# Patient Record
Sex: FEMALE | Race: WHITE | NOT HISPANIC OR LATINO | Employment: OTHER | URBAN - METROPOLITAN AREA
[De-identification: names, ages, dates, MRNs, and addresses within clinical notes are randomized per-mention and may not be internally consistent; named-entity substitution may affect disease eponyms.]

---

## 2017-07-31 ENCOUNTER — HISTORICAL (OUTPATIENT)
Dept: RADIOLOGY | Facility: HOSPITAL | Age: 72
End: 2017-07-31

## 2017-10-02 ENCOUNTER — HISTORICAL (OUTPATIENT)
Dept: SURGERY | Facility: HOSPITAL | Age: 72
End: 2017-10-02

## 2022-04-07 ENCOUNTER — HISTORICAL (OUTPATIENT)
Dept: ADMINISTRATIVE | Facility: HOSPITAL | Age: 77
End: 2022-04-07

## 2022-04-24 VITALS
HEIGHT: 63 IN | BODY MASS INDEX: 34.55 KG/M2 | DIASTOLIC BLOOD PRESSURE: 96 MMHG | WEIGHT: 195 LBS | SYSTOLIC BLOOD PRESSURE: 137 MMHG

## 2022-11-11 ENCOUNTER — HOSPITAL ENCOUNTER (INPATIENT)
Facility: HOSPITAL | Age: 77
LOS: 2 days | Discharge: HOME | DRG: 101 | End: 2022-11-13
Attending: STUDENT IN AN ORGANIZED HEALTH CARE EDUCATION/TRAINING PROGRAM | Admitting: INTERNAL MEDICINE
Payer: COMMERCIAL

## 2022-11-11 DIAGNOSIS — E86.0 DEHYDRATION: ICD-10-CM

## 2022-11-11 DIAGNOSIS — R55 CONVULSIVE SYNCOPE: ICD-10-CM

## 2022-11-11 DIAGNOSIS — R40.20 LOSS OF CONSCIOUSNESS: ICD-10-CM

## 2022-11-11 DIAGNOSIS — I48.0 PAROXYSMAL ATRIAL FIBRILLATION: ICD-10-CM

## 2022-11-11 DIAGNOSIS — J18.9 PNEUMONIA OF LEFT LOWER LOBE DUE TO INFECTIOUS ORGANISM: ICD-10-CM

## 2022-11-11 DIAGNOSIS — R55 SYNCOPE: ICD-10-CM

## 2022-11-11 DIAGNOSIS — I48.91 A-FIB: ICD-10-CM

## 2022-11-11 DIAGNOSIS — R55 SYNCOPE, UNSPECIFIED SYNCOPE TYPE: Primary | ICD-10-CM

## 2022-11-11 LAB
ALBUMIN SERPL-MCNC: 4.3 GM/DL (ref 3.4–4.8)
ALBUMIN/GLOB SERPL: 1.3 RATIO (ref 1.1–2)
ALP SERPL-CCNC: 69 UNIT/L (ref 40–150)
ALT SERPL-CCNC: 25 UNIT/L (ref 0–55)
APTT PPP: 26.1 SECONDS (ref 23.2–33.7)
AST SERPL-CCNC: 22 UNIT/L (ref 5–34)
BASOPHILS # BLD AUTO: 0.07 X10(3)/MCL (ref 0–0.2)
BASOPHILS NFR BLD AUTO: 0.5 %
BILIRUBIN DIRECT+TOT PNL SERPL-MCNC: 0.8 MG/DL
BUN SERPL-MCNC: 28.2 MG/DL (ref 9.8–20.1)
CALCIUM SERPL-MCNC: 9.6 MG/DL (ref 8.4–10.2)
CHLORIDE SERPL-SCNC: 103 MMOL/L (ref 98–107)
CO2 SERPL-SCNC: 26 MMOL/L (ref 23–31)
CREAT SERPL-MCNC: 1.11 MG/DL (ref 0.55–1.02)
EOSINOPHIL # BLD AUTO: 0.04 X10(3)/MCL (ref 0–0.9)
EOSINOPHIL NFR BLD AUTO: 0.3 %
ERYTHROCYTE [DISTWIDTH] IN BLOOD BY AUTOMATED COUNT: 12.2 % (ref 11.5–17)
GFR SERPLBLD CREATININE-BSD FMLA CKD-EPI: 51 MLS/MIN/1.73/M2
GLOBULIN SER-MCNC: 3.4 GM/DL (ref 2.4–3.5)
GLUCOSE SERPL-MCNC: 131 MG/DL (ref 82–115)
HCT VFR BLD AUTO: 48.9 % (ref 37–47)
HGB BLD-MCNC: 17 GM/DL (ref 12–16)
IMM GRANULOCYTES # BLD AUTO: 0.04 X10(3)/MCL (ref 0–0.04)
IMM GRANULOCYTES NFR BLD AUTO: 0.3 %
INR BLD: 0.99 (ref 0–1.3)
LYMPHOCYTES # BLD AUTO: 2.34 X10(3)/MCL (ref 0.6–4.6)
LYMPHOCYTES NFR BLD AUTO: 18.3 %
MAGNESIUM SERPL-MCNC: 1.6 MG/DL (ref 1.6–2.6)
MCH RBC QN AUTO: 30.8 PG (ref 27–31)
MCHC RBC AUTO-ENTMCNC: 34.8 MG/DL (ref 33–36)
MCV RBC AUTO: 88.6 FL (ref 80–94)
MONOCYTES # BLD AUTO: 0.86 X10(3)/MCL (ref 0.1–1.3)
MONOCYTES NFR BLD AUTO: 6.7 %
NEUTROPHILS # BLD AUTO: 9.4 X10(3)/MCL (ref 2.1–9.2)
NEUTROPHILS NFR BLD AUTO: 73.9 %
NRBC BLD AUTO-RTO: 0 %
PLATELET # BLD AUTO: 235 X10(3)/MCL (ref 130–400)
PMV BLD AUTO: 12 FL (ref 7.4–10.4)
POTASSIUM SERPL-SCNC: 4.1 MMOL/L (ref 3.5–5.1)
PROT SERPL-MCNC: 7.7 GM/DL (ref 5.8–7.6)
PROTHROMBIN TIME: 13 SECONDS (ref 12.5–14.5)
RBC # BLD AUTO: 5.52 X10(6)/MCL (ref 4.2–5.4)
SODIUM SERPL-SCNC: 141 MMOL/L (ref 136–145)
T4 FREE SERPL-MCNC: 1.08 NG/DL (ref 0.7–1.48)
TROPONIN I SERPL-MCNC: <0.01 NG/ML (ref 0–0.04)
TSH SERPL-ACNC: 0.6 UIU/ML (ref 0.35–4.94)
WBC # SPEC AUTO: 12.8 X10(3)/MCL (ref 4.5–11.5)

## 2022-11-11 PROCEDURE — 11000001 HC ACUTE MED/SURG PRIVATE ROOM

## 2022-11-11 PROCEDURE — 93010 ELECTROCARDIOGRAM REPORT: CPT | Mod: 77,,, | Performed by: INTERNAL MEDICINE

## 2022-11-11 PROCEDURE — 96365 THER/PROPH/DIAG IV INF INIT: CPT

## 2022-11-11 PROCEDURE — 96361 HYDRATE IV INFUSION ADD-ON: CPT

## 2022-11-11 PROCEDURE — 84443 ASSAY THYROID STIM HORMONE: CPT | Performed by: NURSE PRACTITIONER

## 2022-11-11 PROCEDURE — 85025 COMPLETE CBC W/AUTO DIFF WBC: CPT | Performed by: NURSE PRACTITIONER

## 2022-11-11 PROCEDURE — 85730 THROMBOPLASTIN TIME PARTIAL: CPT | Performed by: NURSE PRACTITIONER

## 2022-11-11 PROCEDURE — 93010 ELECTROCARDIOGRAM REPORT: CPT | Mod: ,,, | Performed by: INTERNAL MEDICINE

## 2022-11-11 PROCEDURE — 99285 EMERGENCY DEPT VISIT HI MDM: CPT | Mod: 25

## 2022-11-11 PROCEDURE — 83735 ASSAY OF MAGNESIUM: CPT | Performed by: NURSE PRACTITIONER

## 2022-11-11 PROCEDURE — 84484 ASSAY OF TROPONIN QUANT: CPT | Performed by: NURSE PRACTITIONER

## 2022-11-11 PROCEDURE — 93010 EKG 12-LEAD: ICD-10-PCS | Mod: ,,, | Performed by: INTERNAL MEDICINE

## 2022-11-11 PROCEDURE — 25000003 PHARM REV CODE 250: Performed by: STUDENT IN AN ORGANIZED HEALTH CARE EDUCATION/TRAINING PROGRAM

## 2022-11-11 PROCEDURE — 93005 ELECTROCARDIOGRAM TRACING: CPT

## 2022-11-11 PROCEDURE — 84439 ASSAY OF FREE THYROXINE: CPT | Performed by: NURSE PRACTITIONER

## 2022-11-11 PROCEDURE — 80053 COMPREHEN METABOLIC PANEL: CPT | Performed by: NURSE PRACTITIONER

## 2022-11-11 PROCEDURE — 93010 EKG 12-LEAD: ICD-10-PCS | Mod: 77,,, | Performed by: INTERNAL MEDICINE

## 2022-11-11 PROCEDURE — 63600175 PHARM REV CODE 636 W HCPCS: Performed by: STUDENT IN AN ORGANIZED HEALTH CARE EDUCATION/TRAINING PROGRAM

## 2022-11-11 PROCEDURE — 85610 PROTHROMBIN TIME: CPT | Performed by: NURSE PRACTITIONER

## 2022-11-11 RX ADMIN — SODIUM CHLORIDE, POTASSIUM CHLORIDE, SODIUM LACTATE AND CALCIUM CHLORIDE 1000 ML: 600; 310; 30; 20 INJECTION, SOLUTION INTRAVENOUS at 08:11

## 2022-11-11 RX ADMIN — CEFTRIAXONE SODIUM 1 G: 1 INJECTION, POWDER, FOR SOLUTION INTRAMUSCULAR; INTRAVENOUS at 10:11

## 2022-11-11 NOTE — FIRST PROVIDER EVALUATION
"Medical screening examination initiated.  I have conducted a focused provider triage encounter, findings are as follows:    Brief history of present illness:  78 y/o female presents with having syncopal episode at home and being pale and diaphoretic. Vomit x1. EMS notes ekg shows afib, no history    Vitals:    11/11/22 1729   BP: 112/63   Pulse: 92   Resp: 18   Temp: 98.2 °F (36.8 °C)   TempSrc: Tympanic   SpO2: 95%   Weight: 90.7 kg (200 lb)   Height: 5' 4" (1.626 m)       Pertinent physical exam:  alert, nonlabored, on EMS stretcher, answers questions    Brief workup plan:  ekg, labs, urine, imaging    Preliminary workup initiated; this workup will be continued and followed by the physician or advanced practice provider that is assigned to the patient when roomed.  "

## 2022-11-12 PROBLEM — R55 CONVULSIVE SYNCOPE: Status: ACTIVE | Noted: 2022-11-12

## 2022-11-12 LAB
ALBUMIN SERPL-MCNC: 3.7 GM/DL (ref 3.4–4.8)
ALBUMIN/GLOB SERPL: 1.5 RATIO (ref 1.1–2)
ALP SERPL-CCNC: 56 UNIT/L (ref 40–150)
ALT SERPL-CCNC: 18 UNIT/L (ref 0–55)
AMPHET UR QL SCN: NEGATIVE
APPEARANCE UR: CLEAR
AST SERPL-CCNC: 14 UNIT/L (ref 5–34)
BACTERIA #/AREA URNS AUTO: ABNORMAL /HPF
BARBITURATE SCN PRESENT UR: NEGATIVE
BASOPHILS # BLD AUTO: 0.05 X10(3)/MCL (ref 0–0.2)
BASOPHILS NFR BLD AUTO: 0.6 %
BENZODIAZ UR QL SCN: NEGATIVE
BILIRUB UR QL STRIP.AUTO: NEGATIVE MG/DL
BILIRUBIN DIRECT+TOT PNL SERPL-MCNC: 0.8 MG/DL
BUN SERPL-MCNC: 26.4 MG/DL (ref 9.8–20.1)
CALCIUM SERPL-MCNC: 8.9 MG/DL (ref 8.4–10.2)
CANNABINOIDS UR QL SCN: POSITIVE
CHLORIDE SERPL-SCNC: 105 MMOL/L (ref 98–107)
CO2 SERPL-SCNC: 28 MMOL/L (ref 23–31)
COCAINE UR QL SCN: NEGATIVE
COLOR UR AUTO: YELLOW
CREAT SERPL-MCNC: 0.81 MG/DL (ref 0.55–1.02)
EOSINOPHIL # BLD AUTO: 0.1 X10(3)/MCL (ref 0–0.9)
EOSINOPHIL NFR BLD AUTO: 1.1 %
ERYTHROCYTE [DISTWIDTH] IN BLOOD BY AUTOMATED COUNT: 12.3 % (ref 11.5–17)
EST. AVERAGE GLUCOSE BLD GHB EST-MCNC: 131.2 MG/DL
FENTANYL UR QL SCN: NEGATIVE
GFR SERPLBLD CREATININE-BSD FMLA CKD-EPI: >60 MLS/MIN/1.73/M2
GLOBULIN SER-MCNC: 2.4 GM/DL (ref 2.4–3.5)
GLUCOSE SERPL-MCNC: 112 MG/DL (ref 82–115)
GLUCOSE UR QL STRIP.AUTO: NEGATIVE MG/DL
HBA1C MFR BLD: 6.2 %
HCT VFR BLD AUTO: 39.3 % (ref 37–47)
HGB BLD-MCNC: 13.5 GM/DL (ref 12–16)
IMM GRANULOCYTES # BLD AUTO: 0.03 X10(3)/MCL (ref 0–0.04)
IMM GRANULOCYTES NFR BLD AUTO: 0.3 %
KETONES UR QL STRIP.AUTO: NEGATIVE MG/DL
LEUKOCYTE ESTERASE UR QL STRIP.AUTO: ABNORMAL UNIT/L
LYMPHOCYTES # BLD AUTO: 3 X10(3)/MCL (ref 0.6–4.6)
LYMPHOCYTES NFR BLD AUTO: 34.5 %
MCH RBC QN AUTO: 30.6 PG (ref 27–31)
MCHC RBC AUTO-ENTMCNC: 34.4 MG/DL (ref 33–36)
MCV RBC AUTO: 89.1 FL (ref 80–94)
MDMA UR QL SCN: NEGATIVE
MONOCYTES # BLD AUTO: 0.66 X10(3)/MCL (ref 0.1–1.3)
MONOCYTES NFR BLD AUTO: 7.6 %
NEUTROPHILS # BLD AUTO: 4.9 X10(3)/MCL (ref 2.1–9.2)
NEUTROPHILS NFR BLD AUTO: 55.9 %
NITRITE UR QL STRIP.AUTO: POSITIVE
NRBC BLD AUTO-RTO: 0 %
OPIATES UR QL SCN: NEGATIVE
PCP UR QL: NEGATIVE
PH UR STRIP.AUTO: 5.5 [PH]
PH UR: 5.5 [PH] (ref 3–11)
PLATELET # BLD AUTO: 189 X10(3)/MCL (ref 130–400)
PMV BLD AUTO: 12.8 FL (ref 7.4–10.4)
POTASSIUM SERPL-SCNC: 3.2 MMOL/L (ref 3.5–5.1)
PROT SERPL-MCNC: 6.1 GM/DL (ref 5.8–7.6)
PROT UR QL STRIP.AUTO: NEGATIVE MG/DL
RBC # BLD AUTO: 4.41 X10(6)/MCL (ref 4.2–5.4)
RBC #/AREA URNS AUTO: <5 /HPF
RBC UR QL AUTO: NEGATIVE UNIT/L
SODIUM SERPL-SCNC: 143 MMOL/L (ref 136–145)
SP GR UR STRIP.AUTO: 1.02 (ref 1–1.03)
SPECIFIC GRAVITY, URINE AUTO (.000) (OHS): 1.02 (ref 1–1.03)
SQUAMOUS #/AREA URNS AUTO: <5 /HPF
TROPONIN I SERPL-MCNC: 0.01 NG/ML (ref 0–0.04)
TROPONIN I SERPL-MCNC: <0.01 NG/ML (ref 0–0.04)
UROBILINOGEN UR STRIP-ACNC: 0.2 MG/DL
WBC # SPEC AUTO: 8.7 X10(3)/MCL (ref 4.5–11.5)
WBC #/AREA URNS AUTO: 10 /HPF

## 2022-11-12 PROCEDURE — 99222 1ST HOSP IP/OBS MODERATE 55: CPT | Mod: ,,, | Performed by: SPECIALIST

## 2022-11-12 PROCEDURE — 36415 COLL VENOUS BLD VENIPUNCTURE: CPT | Performed by: INTERNAL MEDICINE

## 2022-11-12 PROCEDURE — 99222 PR INITIAL HOSPITAL CARE,LEVL II: ICD-10-PCS | Mod: ,,, | Performed by: SPECIALIST

## 2022-11-12 PROCEDURE — 25000003 PHARM REV CODE 250: Performed by: INTERNAL MEDICINE

## 2022-11-12 PROCEDURE — 80053 COMPREHEN METABOLIC PANEL: CPT | Performed by: INTERNAL MEDICINE

## 2022-11-12 PROCEDURE — 83036 HEMOGLOBIN GLYCOSYLATED A1C: CPT | Performed by: INTERNAL MEDICINE

## 2022-11-12 PROCEDURE — 84484 ASSAY OF TROPONIN QUANT: CPT | Performed by: INTERNAL MEDICINE

## 2022-11-12 PROCEDURE — 63600175 PHARM REV CODE 636 W HCPCS: Performed by: INTERNAL MEDICINE

## 2022-11-12 PROCEDURE — 85025 COMPLETE CBC W/AUTO DIFF WBC: CPT | Performed by: INTERNAL MEDICINE

## 2022-11-12 PROCEDURE — 21400001 HC TELEMETRY ROOM

## 2022-11-12 PROCEDURE — 80307 DRUG TEST PRSMV CHEM ANLYZR: CPT | Performed by: INTERNAL MEDICINE

## 2022-11-12 PROCEDURE — 95816 EEG AWAKE AND DROWSY: CPT | Mod: 26,,, | Performed by: SPECIALIST

## 2022-11-12 PROCEDURE — 81001 URINALYSIS AUTO W/SCOPE: CPT | Performed by: INTERNAL MEDICINE

## 2022-11-12 PROCEDURE — 95816 PR EEG,W/AWAKE & DROWSY RECORD: ICD-10-PCS | Mod: 26,,, | Performed by: SPECIALIST

## 2022-11-12 PROCEDURE — 95819 EEG AWAKE AND ASLEEP: CPT

## 2022-11-12 RX ORDER — SODIUM CHLORIDE 0.9 % (FLUSH) 0.9 %
10 SYRINGE (ML) INJECTION
Status: DISCONTINUED | OUTPATIENT
Start: 2022-11-12 | End: 2022-11-13 | Stop reason: HOSPADM

## 2022-11-12 RX ORDER — OXYBUTYNIN CHLORIDE 5 MG/1
5 TABLET ORAL DAILY
Status: DISCONTINUED | OUTPATIENT
Start: 2022-11-12 | End: 2022-11-13 | Stop reason: HOSPADM

## 2022-11-12 RX ORDER — ENOXAPARIN SODIUM 100 MG/ML
1 INJECTION SUBCUTANEOUS
Status: DISCONTINUED | OUTPATIENT
Start: 2022-11-12 | End: 2022-11-12

## 2022-11-12 RX ORDER — OXYBUTYNIN CHLORIDE 5 MG/1
5 TABLET, EXTENDED RELEASE ORAL DAILY
COMMUNITY
Start: 2022-09-20

## 2022-11-12 RX ORDER — AMLODIPINE BESYLATE 5 MG/1
10 TABLET ORAL DAILY
Status: DISCONTINUED | OUTPATIENT
Start: 2022-11-12 | End: 2022-11-13 | Stop reason: HOSPADM

## 2022-11-12 RX ORDER — LISINOPRIL 10 MG/1
10 TABLET ORAL DAILY
Status: DISCONTINUED | OUTPATIENT
Start: 2022-11-12 | End: 2022-11-13 | Stop reason: HOSPADM

## 2022-11-12 RX ORDER — KETOROLAC TROMETHAMINE 5 MG/ML
1 SOLUTION OPHTHALMIC 4 TIMES DAILY
COMMUNITY

## 2022-11-12 RX ORDER — AMLODIPINE BESYLATE 10 MG/1
10 TABLET ORAL DAILY
COMMUNITY
Start: 2022-09-20

## 2022-11-12 RX ORDER — PANTOPRAZOLE SODIUM 40 MG/1
40 TABLET, DELAYED RELEASE ORAL DAILY
Status: DISCONTINUED | OUTPATIENT
Start: 2022-11-12 | End: 2022-11-13 | Stop reason: HOSPADM

## 2022-11-12 RX ORDER — PREDNISOLONE ACETATE 10 MG/ML
1 SUSPENSION/ DROPS OPHTHALMIC 4 TIMES DAILY
COMMUNITY

## 2022-11-12 RX ORDER — IBUPROFEN 800 MG/1
800 TABLET ORAL 3 TIMES DAILY
COMMUNITY
Start: 2022-11-01

## 2022-11-12 RX ORDER — EZETIMIBE 10 MG/1
10 TABLET ORAL DAILY
Status: DISCONTINUED | OUTPATIENT
Start: 2022-11-12 | End: 2022-11-13 | Stop reason: HOSPADM

## 2022-11-12 RX ORDER — LISINOPRIL AND HYDROCHLOROTHIAZIDE 10; 12.5 MG/1; MG/1
1 TABLET ORAL DAILY
COMMUNITY
Start: 2022-10-30

## 2022-11-12 RX ORDER — PANTOPRAZOLE SODIUM 40 MG/1
40 TABLET, DELAYED RELEASE ORAL 2 TIMES DAILY PRN
Status: DISCONTINUED | OUTPATIENT
Start: 2022-11-12 | End: 2022-11-13 | Stop reason: HOSPADM

## 2022-11-12 RX ORDER — IBUPROFEN 800 MG/1
800 TABLET ORAL 3 TIMES DAILY
Status: DISCONTINUED | OUTPATIENT
Start: 2022-11-12 | End: 2022-11-13 | Stop reason: HOSPADM

## 2022-11-12 RX ORDER — EZETIMIBE 10 MG/1
10 TABLET ORAL DAILY
COMMUNITY
Start: 2022-09-20

## 2022-11-12 RX ORDER — SODIUM CHLORIDE, SODIUM LACTATE, POTASSIUM CHLORIDE, CALCIUM CHLORIDE 600; 310; 30; 20 MG/100ML; MG/100ML; MG/100ML; MG/100ML
INJECTION, SOLUTION INTRAVENOUS CONTINUOUS
Status: DISCONTINUED | OUTPATIENT
Start: 2022-11-12 | End: 2022-11-13 | Stop reason: HOSPADM

## 2022-11-12 RX ORDER — OFLOXACIN 3 MG/ML
1 SOLUTION/ DROPS OPHTHALMIC 4 TIMES DAILY
COMMUNITY

## 2022-11-12 RX ORDER — HYDROCHLOROTHIAZIDE 12.5 MG/1
12.5 TABLET ORAL DAILY
Status: DISCONTINUED | OUTPATIENT
Start: 2022-11-12 | End: 2022-11-13 | Stop reason: HOSPADM

## 2022-11-12 RX ORDER — ESOMEPRAZOLE MAGNESIUM 40 MG/1
40 CAPSULE, DELAYED RELEASE ORAL DAILY
COMMUNITY

## 2022-11-12 RX ORDER — MAG HYDROX/ALUMINUM HYD/SIMETH 200-200-20
30 SUSPENSION, ORAL (FINAL DOSE FORM) ORAL EVERY 6 HOURS PRN
Status: DISCONTINUED | OUTPATIENT
Start: 2022-11-12 | End: 2022-11-13 | Stop reason: HOSPADM

## 2022-11-12 RX ORDER — TALC
6 POWDER (GRAM) TOPICAL NIGHTLY PRN
Status: DISCONTINUED | OUTPATIENT
Start: 2022-11-12 | End: 2022-11-13 | Stop reason: HOSPADM

## 2022-11-12 RX ADMIN — Medication 6 MG: at 02:11

## 2022-11-12 RX ADMIN — OXYBUTYNIN CHLORIDE 5 MG: 5 TABLET ORAL at 10:11

## 2022-11-12 RX ADMIN — AMLODIPINE BESYLATE 10 MG: 5 TABLET ORAL at 10:11

## 2022-11-12 RX ADMIN — HYDROCHLOROTHIAZIDE 12.5 MG: 12.5 TABLET ORAL at 10:11

## 2022-11-12 RX ADMIN — PANTOPRAZOLE SODIUM 40 MG: 40 TABLET, DELAYED RELEASE ORAL at 03:11

## 2022-11-12 RX ADMIN — PANTOPRAZOLE SODIUM 40 MG: 40 TABLET, DELAYED RELEASE ORAL at 10:11

## 2022-11-12 RX ADMIN — EZETIMIBE 10 MG: 10 TABLET ORAL at 10:11

## 2022-11-12 RX ADMIN — IBUPROFEN 800 MG: 800 TABLET, FILM COATED ORAL at 08:11

## 2022-11-12 RX ADMIN — ENOXAPARIN SODIUM 90 MG: 100 INJECTION SUBCUTANEOUS at 01:11

## 2022-11-12 RX ADMIN — SODIUM CHLORIDE, POTASSIUM CHLORIDE, SODIUM LACTATE AND CALCIUM CHLORIDE: 600; 310; 30; 20 INJECTION, SOLUTION INTRAVENOUS at 06:11

## 2022-11-12 RX ADMIN — LISINOPRIL 10 MG: 10 TABLET ORAL at 10:11

## 2022-11-12 RX ADMIN — ALUMINUM HYDROXIDE, MAGNESIUM HYDROXIDE, AND DIMETHICONE 30 ML: 200; 20; 200 SUSPENSION ORAL at 04:11

## 2022-11-12 RX ADMIN — IBUPROFEN 800 MG: 800 TABLET, FILM COATED ORAL at 10:11

## 2022-11-12 RX ADMIN — Medication 6 MG: at 08:11

## 2022-11-12 RX ADMIN — APIXABAN 5 MG: 5 TABLET, FILM COATED ORAL at 08:11

## 2022-11-12 NOTE — H&P
[facility] Hospital Medicine History & Physical Examination       Patient Name: Yolanda Price  MRN: [id number]  Patient Class: IP- Inpatient   Admission Date: 11/11/2022  6:19 PM  Length of Stay: 0  Admitting Service: Hospital Medicine   Attending Physician: Parth Montemayor MD   Primary Care Provider: No primary care provider on file.  History source: EMR, patient and/or patient's family    CHIEF COMPLAINT   Loss of Consciousness (Pt reports per aasi c/o syncopal episode while sitting in chair at home. Pt initially diaphoretic and pale, and reports vomiting x1 since this episode. GCS15)      HISTORY OF PRESENT ILLNESS:   Patient is a pleasant 77-year-old female with a history of hypertension who was brought to the ER after having a witnessed syncopal/seizure episode.  Daughter explains at bedside patient was sitting down in the chair and then all of a sudden started to make strange noises with her mouth, and began foaming at the mouth.  During this time daughter was trying to get her attention but was unable to.  She does report vocalizations during this time but no general tonic-clonic movements.  She states these lasted for about 5 minutes.  She explains her mother was diaphoretic during this time as well.  She explains her mother did not eat much throughout the day, and is smoking marijuana.  Denies a history of seizures or strokes.    On arrival to the ER patient was afebrile, hemodynamically stable.  Initial EKG showed atrial fibrillation and then repeat EKG showed sinus arrhythmia.  Laboratory work was significant for hemoconcentration and mild BARB.  CT head was unremarkable.  Chest x-ray showed a patchy left basilar opacity.    PAST MEDICAL HISTORY:   Hypertension    PAST SURGICAL HISTORY:   Cataract surgery    ALLERGIES:   Patient has no allergy information on record.    FAMILY HISTORY:   Reviewed and non-contributory     SOCIAL HISTORY:   Marijuana use   Denies  alcohol use      HOME MEDICATIONS:     Prior to Admission medications    Not on File       REVIEW OF SYSTEMS:   Except as documented, all other systems reviewed and negative     PHYSICAL EXAM:   T 98.2 °F (36.8 °C)   BP (!) 143/65   P 71   RR 16   O2 97 %  GENERAL: awake, alert, oriented and in no acute distress, non-toxic appearing   HEENT: normocephalic atraumatic   NECK: supple   LUNGS: Clear bilaterally, no wheezing or rales, no accessory muscle use   CVS: Regular rate and rhythm, normal peripheral perfusion  ABD: Soft, non-tender, non-distended, bowel sounds present  EXTREMITIES: no clubbing or cyanosis  SKIN: Warm, dry.   NEURO: alert and oriented, grossly without focal deficits   PSYCHIATRIC: Cooperative    LABS AND IMAGING:     Recent Labs     11/11/22 1836   WBC 12.8*   RBC 5.52*   HGB 17.0*   HCT 48.9*   MCV 88.6   MCH 30.8   MCHC 34.8   RDW 12.2        No results for input(s): LACTIC in the last 72 hours.  Recent Labs     11/11/22 1836   INR 0.99     No results for input(s): HGBA1C, CHOL, TRIG, LDL, VLDL, HDL in the last 72 hours.   Recent Labs     11/11/22 1836      K 4.1   CHLORIDE 103   CO2 26   BUN 28.2*   CREATININE 1.11*   GLUCOSE 131*   CALCIUM 9.6   MG 1.60   ALBUMIN 4.3   GLOBULIN 3.4   ALKPHOS 69   ALT 25   AST 22   BILITOT 0.8   TSH 0.6048     Recent Labs     11/11/22 1836   TROPONINI <0.010          X-Ray Chest 1 View  Impression: Patchy left basilar opacity may be related to atelectasis or pneumonia.  Recommend follow up PA and lateral radiographs in 4-6 weeks after completion of appropriate therapy to ensure resolution and exclude persistent opacity, nodule or mass.  Electronically signed by: Joselyn Skelton  Date:    11/11/2022  Time:    18:55    CT Head Without Contrast  Impression: No appreciable acute intracranial abnormality.  White matter changes and old lacunar infarct most suggestive of chronic small vessel ischemic disease.  Electronically signed by: Joselyn  Costa  Date:    11/11/2022  Time:    18:47      ASSESSMENT & PLAN:   Seizure versus syncope   Left lower lung nodular opacity   AFib, newly found  HX:  HTN, cataracts, marijuana use    -MRI brain, EEG, seizure precautions   -echo, carotid ultrasound, telemetry monitoring   -full-dose Lovenox; [id number] is 4, needs OAC at discharge  -CT thorax to better characterize left lower lung opacity    DVT prophylaxis: full dose lovenox  Code status: full     If patient was admitted under observational status it is with my approval/permission.     At least 55 min was spent on this history and physical.  Time seen: 11P<   Parth Montemayor MD

## 2022-11-12 NOTE — CONSULTS
Inpatient consult to Cardiology  Consult performed by: ALEXUS Raza  Consult ordered by: Ivan Pacheco MD  Reason for consult: PAF    [facility]  Cardiology  Consult Note    Patient Name: Yolanda Price  MRN: [id number]  Admission Date: 11/11/2022  Hospital Length of Stay: 1 days  Code Status: Full Code   Attending Provider: Parth Montemayor MD   Consulting Provider: ALEXUS Raza  Primary Care Physician: No primary care provider on file.  Principal Problem:Syncope    Patient information was obtained from patient, past medical records, and ER records.     Subjective:     Chief Complaint:  new onset afib     HPI:   Ms. Price is a 77 year old female who was previously known to [facility], Dr. Varma ([id number]). She presents to the ED after having a witnessed syncopal/seizure episode. Her daughter reports that the patient was sitting in the chair, then all of a sudden started foaming at the mouth & making strange noises for about 5 minutes. She reports that she was diaphoretic during this time. Upon arrival to the ED, an EKG that initially demonstrated Afib. A repeat EKG was obtained & demonstrated Sinus arrhythmia. Significant labwork includes WBC 12.8. Troponins were negative x 3. Her UDS was positive for cannabis. [facility] has been consulted for new afib.     PMH: HTN  PSH: cataract surgery   Family History:   Social History: Reports marijuana use. Denies alcohol or tobacco use.     Previous Cardiac Diagnostics:   BL Carotid US 11.12.22:  The right internal carotid artery demonstrated less than 50% stenosis.  The left internal carotid artery demonstrated no hemodynamically significant stenosis.   Bilateral vertebral arteries were patent with antegrade flow.    LHC 10.2.17:  Normal coronaries, false positive stress test, mild PAD.    TTE 9.14.17:  The study quality is good.   Global left ventricular systolic function is normal. The left ventricular ejection fraction  is 65%. Left ventricular diastolic function is normal.   Trace mitral and tricuspid regurgitation.   Mild calcification of the aortic valve is noted with adequate cuspal excursion.   Mild mitral annular calcification is noted.   The estimated pulmonary artery systolic pressure is 27 mmHg assuming a right atrial pressure of 5 mmHg.     Review of patient's allergies indicates:  Not on File    Review of Systems   Respiratory:  Negative for shortness of breath.    Cardiovascular:  Negative for chest pain and palpitations.     Objective:     Vital Signs (Most Recent):  Temp: 97.7 °F (36.5 °C) (11/12/22 1115)  Pulse: 66 (11/12/22 1115)  Resp: 20 (11/12/22 1115)  BP: 122/71 (11/12/22 1115)  SpO2: 95 % (11/12/22 1115) Vital Signs (24h Range):  Temp:  [97.7 °F (36.5 °C)-98.2 °F (36.8 °C)] 97.7 °F (36.5 °C)  Pulse:  [62-92] 66  Resp:  [16-20] 20  SpO2:  [91 %-100 %] 95 %  BP: (106-143)/(63-78) 122/71     Weight: 90.7 kg (200 lb)  Body mass index is 34.33 kg/m².    SpO2: 95 %  O2 Device (Oxygen Therapy): room air    No intake or output data in the 24 hours ending 11/12/22 1222    Lines/Drains/Airways       Peripheral Intravenous Line  Duration                  Peripheral IV - Single Lumen 11/11/22 1831 20 G Left;Posterior Hand <1 day                    Significant Labs:  Recent Results (from the past 72 hour(s))   CBC with Differential    Collection Time: 11/11/22  6:36 PM   Result Value Ref Range    WBC 12.8 (H) 4.5 - 11.5 x10(3)/mcL    RBC 5.52 (H) 4.20 - 5.40 x10(6)/mcL    Hgb 17.0 (H) 12.0 - 16.0 gm/dL    Hct 48.9 (H) 37.0 - 47.0 %    MCV 88.6 80.0 - 94.0 fL    MCH 30.8 27.0 - 31.0 pg    MCHC 34.8 33.0 - 36.0 mg/dL    RDW 12.2 11.5 - 17.0 %    Platelet 235 130 - 400 x10(3)/mcL    MPV 12.0 (H) 7.4 - 10.4 fL    Neut % 73.9 %    Lymph % 18.3 %    Mono % 6.7 %    Eos % 0.3 %    Basophil % 0.5 %    Lymph # 2.34 0.6 - 4.6 x10(3)/mcL    Neut # 9.4 (H) 2.1 - 9.2 x10(3)/mcL    Mono # 0.86 0.1 - 1.3 x10(3)/mcL    Eos # 0.04 0 - 0.9  x10(3)/mcL    Baso # 0.07 0 - 0.2 x10(3)/mcL    IG# 0.04 0 - 0.04 x10(3)/mcL    IG% 0.3 %    NRBC% 0.0 %   Comprehensive Metabolic Panel    Collection Time: 11/11/22  6:36 PM   Result Value Ref Range    Sodium Level 141 136 - 145 mmol/L    Potassium Level 4.1 3.5 - 5.1 mmol/L    Chloride 103 98 - 107 mmol/L    Carbon Dioxide 26 23 - 31 mmol/L    Glucose Level 131 (H) 82 - 115 mg/dL    Blood Urea Nitrogen 28.2 (H) 9.8 - 20.1 mg/dL    Creatinine 1.11 (H) 0.55 - 1.02 mg/dL    Calcium Level Total 9.6 8.4 - 10.2 mg/dL    Protein Total 7.7 (H) 5.8 - 7.6 gm/dL    Albumin Level 4.3 3.4 - 4.8 gm/dL    Globulin 3.4 2.4 - 3.5 gm/dL    Albumin/Globulin Ratio 1.3 1.1 - 2.0 ratio    Bilirubin Total 0.8 <=1.5 mg/dL    Alkaline Phosphatase 69 40 - 150 unit/L    Alanine Aminotransferase 25 0 - 55 unit/L    Aspartate Aminotransferase 22 5 - 34 unit/L    eGFR 51 mls/min/1.73/m2   Magnesium    Collection Time: 11/11/22  6:36 PM   Result Value Ref Range    Magnesium Level 1.60 1.60 - 2.60 mg/dL   TSH    Collection Time: 11/11/22  6:36 PM   Result Value Ref Range    Thyroid Stimulating Hormone 0.6048 0.3500 - 4.9400 uIU/mL   Protime-INR    Collection Time: 11/11/22  6:36 PM   Result Value Ref Range    PT 13.0 12.5 - 14.5 seconds    INR 0.99 0.00 - 1.30   APTT    Collection Time: 11/11/22  6:36 PM   Result Value Ref Range    PTT 26.1 23.2 - 33.7 seconds   Troponin I    Collection Time: 11/11/22  6:36 PM   Result Value Ref Range    Troponin-I <0.010 0.000 - 0.045 ng/mL   T4, Free    Collection Time: 11/11/22  6:36 PM   Result Value Ref Range    Thyroxine Free 1.08 0.70 - 1.48 ng/dL   Hemoglobin A1C    Collection Time: 11/12/22 12:37 AM   Result Value Ref Range    Hemoglobin A1c 6.2 <=7.0 %    Estimated Average Glucose 131.2 mg/dL   Troponin I    Collection Time: 11/12/22 12:37 AM   Result Value Ref Range    Troponin-I 0.015 0.000 - 0.045 ng/mL   Comprehensive metabolic panel    Collection Time: 11/12/22  5:25 AM   Result Value Ref Range     Sodium Level 143 136 - 145 mmol/L    Potassium Level 3.2 (L) 3.5 - 5.1 mmol/L    Chloride 105 98 - 107 mmol/L    Carbon Dioxide 28 23 - 31 mmol/L    Glucose Level 112 82 - 115 mg/dL    Blood Urea Nitrogen 26.4 (H) 9.8 - 20.1 mg/dL    Creatinine 0.81 0.55 - 1.02 mg/dL    Calcium Level Total 8.9 8.4 - 10.2 mg/dL    Protein Total 6.1 5.8 - 7.6 gm/dL    Albumin Level 3.7 3.4 - 4.8 gm/dL    Globulin 2.4 2.4 - 3.5 gm/dL    Albumin/Globulin Ratio 1.5 1.1 - 2.0 ratio    Bilirubin Total 0.8 <=1.5 mg/dL    Alkaline Phosphatase 56 40 - 150 unit/L    Alanine Aminotransferase 18 0 - 55 unit/L    Aspartate Aminotransferase 14 5 - 34 unit/L    eGFR >60 mls/min/1.73/m2   Troponin I    Collection Time: 11/12/22  5:25 AM   Result Value Ref Range    Troponin-I <0.010 0.000 - 0.045 ng/mL   CBC with Differential    Collection Time: 11/12/22  5:25 AM   Result Value Ref Range    WBC 8.7 4.5 - 11.5 x10(3)/mcL    RBC 4.41 4.20 - 5.40 x10(6)/mcL    Hgb 13.5 12.0 - 16.0 gm/dL    Hct 39.3 37.0 - 47.0 %    MCV 89.1 80.0 - 94.0 fL    MCH 30.6 27.0 - 31.0 pg    MCHC 34.4 33.0 - 36.0 mg/dL    RDW 12.3 11.5 - 17.0 %    Platelet 189 130 - 400 x10(3)/mcL    MPV 12.8 (H) 7.4 - 10.4 fL    Neut % 55.9 %    Lymph % 34.5 %    Mono % 7.6 %    Eos % 1.1 %    Basophil % 0.6 %    Lymph # 3.00 0.6 - 4.6 x10(3)/mcL    Neut # 4.9 2.1 - 9.2 x10(3)/mcL    Mono # 0.66 0.1 - 1.3 x10(3)/mcL    Eos # 0.10 0 - 0.9 x10(3)/mcL    Baso # 0.05 0 - 0.2 x10(3)/mcL    IG# 0.03 0 - 0.04 x10(3)/mcL    IG% 0.3 %    NRBC% 0.0 %   Drug Screen, Urine    Collection Time: 11/12/22  9:23 AM   Result Value Ref Range    Amphetamines, Urine Negative Negative    Barbituates, Urine Negative Negative    Benzodiazepine, Urine Negative Negative    Cannabinoids, Urine Positive (A) Negative    Cocaine, Urine Negative Negative    Fentanyl, Urine Negative Negative    MDMA, Urine Negative Negative    Opiates, Urine Negative Negative    Phencyclidine, Urine Negative Negative    pH, Urine 5.5 3.0 -  11.0    Specific Gravity, Urine Auto 1.022 1.001 - 1.035   Urinalysis, Reflex to Urine Culture Urine, Clean Catch    Collection Time: 11/12/22  9:24 AM    Specimen: Urine   Result Value Ref Range    Color, UA Yellow Yellow, Light-Yellow, Dark Yellow, [place], Straw    Appearance, UA Clear Clear    Specific Gravity, UA 1.022 1.001 - 1.030    pH, UA 5.5 5.0 - 8.5    Protein, UA Negative Negative mg/dL    Glucose, UA Negative Negative, Normal mg/dL    Ketones, UA Negative Negative mg/dL    Blood, UA Negative Negative unit/L    Bilirubin, UA Negative Negative mg/dL    Urobilinogen, UA 0.2 0.2, 1.0, Normal mg/dL    Nitrites, UA Positive (A) Negative    Leukocyte Esterase, UA Trace (A) Negative unit/L   Urinalysis, Microscopic    Collection Time: 11/12/22  9:24 AM   Result Value Ref Range    RBC, UA <5 <=5 /HPF    WBC, UA 10 (H) <=5 /HPF    Squamous Epithelial Cells, UA <5 <=5 /HPF    Bacteria, UA None Seen None Seen, Rare, Occasional /HPF   CV Ultrasound Bilateral Doppler Carotid    Collection Time: 11/12/22  9:56 AM   Result Value Ref Range    Left ICA/CCA ratio 0.89     Right ICA/CCA ratio 0.93     Left Highest ICA 67.00     Left Highest CCA 75     Right Highest ICA 63.00     Right Highest CCA 73     Right Highest EDV 15.00     LT Highest EDV 16.00     Right CCA prox sys 73 cm/s    Right CCA prox storey 12 cm/s    Right CCA dist sys 68 cm/s    Right CCA dist storey 12 cm/s    Right ICA prox sys 45 cm/s    Right ICA prox storey 8 cm/s    Right ICA mid sys 62 cm/s    Right ICA mid storey 15 cm/s    Right ICA dist sys 63 cm/s    Right ICA dist storey 14 cm/s    Right ECA sys 85 cm/s    Right vertebral sys 37 cm/s    Left CCA prox sys 66 cm/s    Left CCA prox storey 11 cm/s    Left CCA dist sys 75 cm/s    Left CCA dist storey 13 cm/s    Left ICA prox sys 46 cm/s    Left ICA prox storey 9 cm/s    Left ICA mid sys 60 cm/s    Left ICA mid storey 16 cm/s    Left ICA dist sys 67 cm/s    Left ICA dist storey 16 cm/s    Left ECA sys 115 cm/s    Left  vertebral sys 47 cm/s    Right ECA storey 6 cm/s    Left vertebral storey 5 cm/s    Left ECA storey 4 cm/s       Significant Imaging:  Imaging Results              MRI Brain Without Contrast (Final result)  Result time 11/12/22 09:27:16      Final result by Ibeth Paulino MD (11/12/22 09:27:16)                   Impression:      1. No acute intracranial abnormality.  2. Moderate chronic microvascular ischemic changes.  [facility].      Electronically signed by: Ibeth Paulino  Date:    11/12/2022  Time:    09:27               Narrative:    EXAMINATION:  MRI BRAIN WITHOUT CONTRAST    CLINICAL HISTORY:  Seizure, new-onset, no history of trauma;    TECHNIQUE:  Multiplanar, multisequence MR images of the brain were obtained without the administration of intravenous contrast.    COMPARISON:  CT head dated 11/11/2022    FINDINGS:  There is no restricted diffusion, acute hemorrhage or edema.  There are moderate patchy T2/FLAIR hyperintensities in the subcortical and periventricular white matter, likely representing chronic microvascular ischemic changes.  There have been prior lacunar infarcts in the right basal ganglia with hemosiderin staining.  The hippocampi are similar in size and signal.    There is no mass effect or midline shift.  The basal cisterns are patent.  There is moderate diffuse parenchymal volume loss.  There is no hydrocephalus or abnormal extra-axial fluid collection.  The major intracranial flow voids are patent.  The paranasal sinuses are clear.  There are bilateral mastoid effusions.                        Preliminary result by Ibeth Paulino MD (11/12/22 02:49:31)                   Narrative:    START OF REPORT:  Technique: Multiplanar, multisequence magnetic resonance imaging of the brain was performed without intravenous contrast.    Comparison: Correlation is with CT study dated2022-11-11 18:41:43.    Clinical history: LOC, possible  seizure.    Findings:  Hemorrhage: No acute intracranial hemorrhage is identified.  Stroke: No abnormal signal is identified on the diffusion images to suggest acute infarct.  Extra axial spaces: The ventricles and sulci and basal cisterns all appear moderately prominent consistent with global cerebral atrophy.  Cerebral, cerebellar, and brainstem parenchyma: Moderate periventricular and subcortical white matter signal abnormalities are seen. The main consideration is small vessel ischemic changes. Again noted is an old lacunar infarct in the right basal ganglia (series 7, image 12).  Cranial nerves: Normal.  Herniation: None.  Calvarium: Within normal limits.  Vascular system: Normal flow voids.  Visualized paranasal sinuses: Normal.  Visualized orbits: The visualized orbits appear unremarkable.  Sella and skull base: Normal.  Temporal bones and mastoids: Minimal fluid is also seen in the right mastoid air cells. These may be related to mastoid effusions.      Impression:  1. No acute intracranial process is identified. Details as above.                          Preliminary result by Alejandro Iglesias MD (11/12/22 02:49:31)                   Narrative:    START OF REPORT:  Technique: Multiplanar, multisequence magnetic resonance imaging of the brain was performed without intravenous contrast.    Comparison: Correlation is with CT study dated2022-11-11 18:41:43.    Clinical history: LOC, possible seizure.    Findings:  Hemorrhage: No acute intracranial hemorrhage is identified.  Stroke: No abnormal signal is identified on the diffusion images to suggest acute infarct.  Extra axial spaces: The ventricles and sulci and basal cisterns all appear moderately prominent consistent with global cerebral atrophy.  Cerebral, cerebellar, and brainstem parenchyma: Moderate periventricular and subcortical white matter signal abnormalities are seen. The main consideration is small vessel ischemic changes. Again noted is an old  lacunar infarct in the right basal ganglia (series 7, image 12).  Cranial nerves: Normal.  Herniation: None.  Calvarium: Within normal limits.  Vascular system: Normal flow voids.  Visualized paranasal sinuses: Normal.  Visualized orbits: The visualized orbits appear unremarkable.  Sella and skull base: Normal.  Temporal bones and mastoids: Minimal fluid is also seen in the right mastoid air cells. These may be related to mastoid effusions.      Impression:  1. No acute intracranial process is identified. Details as above.                                         CT Chest Without Contrast (Final result)  Result time 11/12/22 09:53:54      Final result by Joselyn Skelton MD (11/12/22 09:53:54)                   Impression:      No pulmonary nodule.      Electronically signed by: Joselyn Skelton  Date:    11/12/2022  Time:    09:53               Narrative:    EXAMINATION:  CT CHEST WITHOUT CONTRAST    CLINICAL HISTORY:  Abnormal xray - lung nodule, >= 1 cm;    TECHNIQUE:  Helically acquired axial images, sagittal and coronal reformations were obtained from the thoracic inlet to the lung bases without the IV administration of contrast.    Automated tube current modulation, weight-based exposure dosing, and/or iterative reconstruction technique utilized to reach lowest reasonably achievable exposure rate.    DLP: 711 mGy*cm    COMPARISON:  No relevant priors available for comparison at time of this dictation    FINDINGS:  BASE OF NECK: No significant abnormality.    AORTA: Aortic atherosclerosis.    HEART: Normal size. No effusion. There are coronary artery calcifications.    MORE/MEDIASTINUM: No enlarged lymph nodes by size criteria.  Evaluation of hilar lymphadenopathy is limited without intravenous contrast. Small sliding hiatal hernia.    AIRWAYS: Patent.    LUNGS: Mild dependent atelectasis.  No pulmonary nodule.    PLEURA: No pleural effusion or pneumothorax.    UPPER ABDOMEN: No abnormality of the partially  imaged upper abdomen.    THORACIC SOFT TISSUES: Probable sebaceous cyst at the upper back to the right of midline.    BONES: Thoracic spondylosis.                        Preliminary result by Joselyn Skelton MD (11/12/22 01:24:52)                   Narrative:    START OF REPORT:  Technique: CT Scan of the chest was performed without intravenous contrast with direct axial as well as sagittal and, coronal, reconstruction images.    Dosage Information: Automated Exposure Control was utilized.    Comparison: None.    Clinical History: Sob.    Findings:  Soft Tissues: Unremarkable.  Neck: The visualized soft tissues of the neck appear unremarkable.  Mediastinum: The mediastinal structures are within normal limits. There is a small hiatal hernia.  Heart: Coronary artery calcification is seen.  Aorta: Mild aortic calcification is seen in the thoracic aorta.  Pulmonary Arteries: Unremarkable.  Lungs: Subtle scattered streaky linear opacity is seen in the dependent portions at the lung bases consistent with scarring and subsegmental atelectasis. No acute focal infiltrate or consolidation is seen.  Pleura: No effusions or pneumothorax are identified.  Bony Structures:  Spine: Severe multilevel spondylolytic changes are seen in the thoracic spine.  Ribs: No rib fractures are identified.  Abdomen: The visualized upper abdominal organs appear unremarkable.      Impression:  1. No acute focal infiltrate or consolidation is seen.  2. No acute intrathoracic process identified. Details and other findings as discussed above.                          Preliminary result by Alejandro Iglesias MD (11/12/22 01:24:52)                   Narrative:    START OF REPORT:  Technique: CT Scan of the chest was performed without intravenous contrast with direct axial as well as sagittal and, coronal, reconstruction images.    Dosage Information: Automated Exposure Control was utilized.    Comparison: None.    Clinical History:  Sob.    Findings:  Soft Tissues: Unremarkable.  Neck: The visualized soft tissues of the neck appear unremarkable.  Mediastinum: The mediastinal structures are within normal limits. There is a small hiatal hernia.  Heart: Coronary artery calcification is seen.  Aorta: Mild aortic calcification is seen in the thoracic aorta.  Pulmonary Arteries: Unremarkable.  Lungs: Subtle scattered streaky linear opacity is seen in the dependent portions at the lung bases consistent with scarring and subsegmental atelectasis. No acute focal infiltrate or consolidation is seen.  Pleura: No effusions or pneumothorax are identified.  Bony Structures:  Spine: Severe multilevel spondylolytic changes are seen in the thoracic spine.  Ribs: No rib fractures are identified.  Abdomen: The visualized upper abdominal organs appear unremarkable.      Impression:  1. No acute focal infiltrate or consolidation is seen.  2. No acute intrathoracic process identified. Details and other findings as discussed above.                                         X-Ray Chest 1 View (Final result)  Result time 11/11/22 18:55:48      Final result by Joselyn Skelton MD (11/11/22 18:55:48)                   Impression:      Patchy left basilar opacity may be related to atelectasis or pneumonia.  Recommend follow up PA and lateral radiographs in 4-6 weeks after completion of appropriate therapy to ensure resolution and exclude persistent opacity, nodule or mass.      Electronically signed by: Joselyn Skelton  Date:    11/11/2022  Time:    18:55               Narrative:    EXAMINATION:  XR CHEST 1 VIEW    CLINICAL HISTORY:  afib;    TECHNIQUE:  Single frontal view of the chest was performed.    COMPARISON:  None    FINDINGS:  LINES AND TUBES: EKG/telemetry leads overlie the chest.    MEDIASTINUM AND MORE: The cardiac silhouette is normal. Aortic atherosclerosis.    LUNGS: Patchy left basilar opacity.    PLEURA:No pleural effusion. No pneumothorax.    OTHER:  No acute osseous abnormality.                                       CT Head Without Contrast (Final result)  Result time 11/11/22 18:47:00      Final result by Joselyn Skelton MD (11/11/22 18:47:00)                   Impression:      No appreciable acute intracranial abnormality.    White matter changes and old lacunar infarct most suggestive of chronic small vessel ischemic disease.      Electronically signed by: Joselyn Skelton  Date:    11/11/2022  Time:    18:47               Narrative:    EXAMINATION:  CT HEAD WITHOUT CONTRAST    CLINICAL HISTORY:  Syncope, recurrent;    TECHNIQUE:  Low dose axial CT images obtained throughout the head without intravenous contrast.  Axial, sagittal and coronal reconstructions were performed and interpreted.    DLP: 921 mGycm    All CT scans at this location are performed using dose optimization techniques as appropriate to a performed exam including the following automated exposure control, adjustment of the mA and/or kV according to patient size and/or use of iterative reconstruction technique    COMPARISON:  No relevant prior available for comparison.    FINDINGS:  BRAIN: Gray white differentiation is maintained. Periventricular and subcortical white matter changes most compatible with chronic small vessel ischemic disease.  Old lacunar infarct at the right caudate.  Moderate cerebral atrophy.  No hemorrhage. No edema. No mass effect or midline shift.  The posterior fossa and midline structures are unremarkable.  There are calcifications of the cavernous carotid arteries.    VENTRICLES: Normal in size and configuration.    EXTRA-AXIAL: No abnormal extra-axial collections.    BONES: Calvarium is intact.    SINUSES AND MASTOIDS: Left mastoid effusion.                                      EKG:    Results for orders placed or performed during the hospital encounter of 11/11/22   EKG 12-lead    Narrative    Test Reason : R40.20,    Vent. Rate : 104 BPM     Atrial Rate : 000  BPM     P-R Int : 000 ms          QRS Dur : 084 ms      QT Int : 366 ms       P-R-T Axes : 000 -25 103 degrees     QTc Int : 481 ms    Atrial fibrillation with rapid ventricular response  Nonspecific T wave abnormality  Abnormal ECG  No previous ECGs available  Confirmed by Migue Lozada MD ([id number]) on 11/11/2022 5:58:07 PM    Referred By:             Confirmed By:Migue Lozada MD       Telemetry:  SR 80's    Physical Exam  HENT:      Head: Normocephalic.      Nose: Nose normal.      Mouth/Throat:      Mouth: Mucous membranes are moist.   Eyes:      Extraocular Movements: Extraocular movements intact.   Cardiovascular:      Rate and Rhythm: Normal rate and regular rhythm.      Pulses: Normal pulses.      Heart sounds: Normal heart sounds.   Pulmonary:      Effort: Pulmonary effort is normal.      Breath sounds: Normal breath sounds.   Abdominal:      Palpations: Abdomen is soft.   Skin:     General: Skin is warm and dry.   Neurological:      Mental Status: She is alert and oriented to person, place, and time.   Psychiatric:         Behavior: Behavior normal.       Home Medications:   No current facility-administered medications on file prior to encounter.     Current Outpatient Medications on File Prior to Encounter   Medication Sig Dispense Refill    amLODIPine (NORVASC) 10 MG tablet Take 10 mg by mouth once daily.      esomeprazole (NEXIUM) 40 MG capsule Take 40 mg by mouth once daily.      ezetimibe (ZETIA) 10 mg tablet Take 10 mg by mouth once daily.      ibuprofen (ADVIL,MOTRIN) 800 MG tablet Take 800 mg by mouth 3 (three) times daily.      lisinopriL-hydrochlorothiazide (PRINZIDE,ZESTORETIC) 10-12.5 mg per tablet Take 1 tablet by mouth once daily.      oxybutynin (DITROPAN-XL) 5 MG TR24 Take 5 mg by mouth once daily.         Current Inpatient Medications:    Current Facility-Administered Medications:     amLODIPine tablet 10 mg, 10 mg, Oral, Daily, Ivan Pacheco MD, 10 mg at 11/12/22 1044    apixaban tablet 5  mg, 5 mg, Oral, BID, Ivan Pacheco MD    ezetimibe tablet 10 mg, 10 mg, Oral, Daily, Ivan Pacheco MD, 10 mg at 11/12/22 1044    hydroCHLOROthiazide tablet 12.5 mg, 12.5 mg, Oral, Daily, Ivan Pacheco MD, 12.5 mg at 11/12/22 1044    ibuprofen tablet 800 mg, 800 mg, Oral, TID, Ivan Pacheco MD, 800 mg at 11/12/22 1044    lactated ringers infusion, , Intravenous, Continuous, Ivan Pacheco MD, Last Rate: 100 mL/hr at 11/12/22 0633, New Bag at 11/12/22 0633    lisinopriL tablet 10 mg, 10 mg, Oral, Daily, Ivan Pacheco MD, 10 mg at 11/12/22 1044    melatonin tablet 6 mg, 6 mg, Oral, Nightly PRN, Parth Montemayor MD, 6 mg at 11/12/22 0210    oxybutynin tablet 5 mg, 5 mg, Oral, Daily, Ivan Pacheco MD, 5 mg at 11/12/22 1044    pantoprazole EC tablet 40 mg, 40 mg, Oral, BID PRN, Parth Montemayor MD, 40 mg at 11/12/22 0314    pantoprazole EC tablet 40 mg, 40 mg, Oral, Daily, Ivan Pacheco MD, 40 mg at 11/12/22 1044    sodium chloride 0.9% flush 10 mL, 10 mL, Intravenous, PRN, Parth Montemayor MD         VTE Risk Mitigation (From admission, onward)           Ordered     apixaban tablet 5 mg  2 times daily         11/12/22 0614                    Assessment:   New onset atrial fibrillation - now SR    - CHADSVASC Score 4 Points  Convulsive syncope  Dehydration  HTN    Plan:   Echo pending.   Eliquis started by primary for stroke prevention in the setting of PAF & elevated CHADSVASC Score.    Thank you for your consult.     Radha Wolf, ALEXUS  Cardiology  [facility]  11/12/2022 12:22 PM

## 2022-11-12 NOTE — ED PROVIDER NOTES
Encounter Date: 11/11/2022    SCRIBE #1 NOTE: I, Harrynessa Strickland, am scribing for, and in the presence of,  Jose Villanueva IV, MD. I have scribed the following portions of the note - Other sections scribed: HPI, ROS, PE.     History     Chief Complaint   Patient presents with    Loss of Consciousness     Pt reports per aasi c/o syncopal episode while sitting in chair at home. Pt initially diaphoretic and pale, and reports vomiting x1 since this episode. GCS15     A 76 y/o female presents to [facility] ED after an episode of unresponsiveness witnessed by her daughter while they were sitting on the sofa and talking earlier today. Daughter reports that the pt was unresponsive, foaming at the mouth, and possibly shaking. Pt reports that she has never had an episode of this sort before.   Pt is status post cataract surgery.   Pt denies SOB, chest pain, and cough.     The history is provided by the patient and a relative.   Loss of Consciousness  This is a new problem. Episode onset: today. Episode frequency: a single episode. The problem has been resolved. Pertinent negatives include no chest pain, no abdominal pain and no shortness of breath. Nothing aggravates the symptoms. Nothing relieves the symptoms. She has tried nothing for the symptoms. The treatment provided no relief.   Review of patient's allergies indicates:  Not on File  History reviewed. No pertinent past medical history.  History reviewed. No pertinent surgical history.  History reviewed. No pertinent family history.     Review of Systems   Constitutional:  Negative for chills and fever.   HENT:  Negative for congestion, rhinorrhea and sore throat.    Eyes:  Negative for visual disturbance.   Respiratory:  Negative for cough and shortness of breath.    Cardiovascular:  Positive for syncope. Negative for chest pain and leg swelling.   Gastrointestinal:  Negative for abdominal pain, nausea and vomiting.   Genitourinary:  Negative for dysuria, hematuria,  vaginal bleeding and vaginal discharge.   Musculoskeletal:  Negative for joint swelling.   Skin:  Negative for rash.   Neurological:  Positive for syncope. Negative for weakness.   Psychiatric/Behavioral:  Negative for confusion.      Physical Exam     Initial Vitals [11/11/22 1729]   BP Pulse Resp Temp SpO2   112/63 92 18 98.2 °F (36.8 °C) 95 %      MAP       --         Physical Exam    Nursing note and vitals reviewed.  Constitutional: She is not diaphoretic. No distress.   HENT:   Head: Normocephalic and atraumatic.   Mouth/Throat: Mucous membranes are dry.   Eyewear in place s/p cataract surgery    Eyes: EOM are normal. Pupils are equal, round, and reactive to light.   Neck: Neck supple.   Normal range of motion.  Cardiovascular:  Normal rate and regular rhythm.           No murmur heard.  Pulmonary/Chest: Breath sounds normal. No respiratory distress. She has no wheezes. She has no rales.   Abdominal: Abdomen is soft. She exhibits no distension. There is no abdominal tenderness.   Musculoskeletal:         General: Normal range of motion.      Cervical back: Normal range of motion and neck supple.     Neurological: She is alert and oriented to person, place, and time. She has normal strength.   Skin: Skin is warm. No rash noted.   Psychiatric: She has a normal mood and affect.       ED Course   Procedures  Labs Reviewed   CBC WITH DIFFERENTIAL - Abnormal; Notable for the following components:       Result Value    WBC 12.8 (*)     RBC 5.52 (*)     Hgb 17.0 (*)     Hct 48.9 (*)     MPV 12.0 (*)     Neut # 9.4 (*)     All other components within normal limits   COMPREHENSIVE METABOLIC PANEL - Abnormal; Notable for the following components:    Glucose Level 131 (*)     Blood Urea Nitrogen 28.2 (*)     Creatinine 1.11 (*)     Protein Total 7.7 (*)     All other components within normal limits   MAGNESIUM - Normal   TSH - Normal   PROTIME-INR - Normal   APTT - Normal   TROPONIN I - Normal   T4, FREE - Normal    HEMOGLOBIN A1C   URINALYSIS, REFLEX TO URINE CULTURE   CBC W/ AUTO DIFFERENTIAL    Narrative:     The following orders were created for panel order CBC auto differential.  Procedure                               Abnormality         Status                     ---------                               -----------         ------                     CBC with Differential[[id number]]                                                         Please view results for these tests on the individual orders.   COMPREHENSIVE METABOLIC PANEL   TROPONIN I   CBC WITH DIFFERENTIAL     EKG Readings: (Independently Interpreted)   Rhythm: Atrial Fibrillation (with RVR). Heart Rate: 104. Ectopy: No Ectopy. Conduction: Normal. ST Segments: Normal ST Segments. T Waves: Normal. Axis: Normal.   Time:   No obvious ischemic changes   Other EKG Interpretations: Repeat EK. Rhythm: NSR. Heart Rate: 63. Ectopy: No Ectopy. Conduction: Normal: ST Segments: Normal ST Segments. T Waves: Normal. Axis: Normal   ECG Results              EKG 12-lead (Final result)  Result time 22 17:58:19      Final result by Interface, Lab In [facility] (22 17:58:19)                   Narrative:    Test Reason : R40.20,    Vent. Rate : 104 BPM     Atrial Rate : 000 BPM     P-R Int : 000 ms          QRS Dur : 084 ms      QT Int : 366 ms       P-R-T Axes : 000 -25 103 degrees     QTc Int : 481 ms    Atrial fibrillation with rapid ventricular response  Nonspecific T wave abnormality  Abnormal ECG  No previous ECGs available  Confirmed by Migue Lozada MD ([id number]) on 2022 5:58:07 PM    Referred By:             Confirmed By:Migue Lozada MD                                  Imaging Results              MRI Brain Without Contrast (Preliminary result)  Result time 22 02:49:31      Preliminary result by Alejandro Iglesias MD (22 02:49:31)                   Narrative:    START OF REPORT:  Technique: Multiplanar, multisequence magnetic resonance  imaging of the brain was performed without intravenous contrast.    Comparison: Correlation is with CT study dated2022-11-11 18:41:43.    Clinical history: LOC, possible seizure.    Findings:  Hemorrhage: No acute intracranial hemorrhage is identified.  Stroke: No abnormal signal is identified on the diffusion images to suggest acute infarct.  Extra axial spaces: The ventricles and sulci and basal cisterns all appear moderately prominent consistent with global cerebral atrophy.  Cerebral, cerebellar, and brainstem parenchyma: Moderate periventricular and subcortical white matter signal abnormalities are seen. The main consideration is small vessel ischemic changes. Again noted is an old lacunar infarct in the right basal ganglia (series 7, image 12).  Cranial nerves: Normal.  Herniation: None.  Calvarium: Within normal limits.  Vascular system: Normal flow voids.  Visualized paranasal sinuses: Normal.  Visualized orbits: The visualized orbits appear unremarkable.  Sella and skull base: Normal.  Temporal bones and mastoids: Minimal fluid is also seen in the right mastoid air cells. These may be related to mastoid effusions.      Impression:  1. No acute intracranial process is identified. Details as above.                          Preliminary result by Interface, Rad Results In (11/12/22 02:49:31)                   Narrative:    START OF REPORT:  Technique: Multiplanar, multisequence magnetic resonance imaging of the brain was performed without intravenous contrast.    Comparison: Correlation is with CT study dated2022-11-11 18:41:43.    Clinical history: LOC, possible seizure.    Findings:  Hemorrhage: No acute intracranial hemorrhage is identified.  Stroke: No abnormal signal is identified on the diffusion images to suggest acute infarct.  Extra axial spaces: The ventricles and sulci and basal cisterns all appear moderately prominent consistent with global cerebral atrophy.  Cerebral, cerebellar, and brainstem  parenchyma: Moderate periventricular and subcortical white matter signal abnormalities are seen. The main consideration is small vessel ischemic changes. Again noted is an old lacunar infarct in the right basal ganglia (series 7, image 12).  Cranial nerves: Normal.  Herniation: None.  Calvarium: Within normal limits.  Vascular system: Normal flow voids.  Visualized paranasal sinuses: Normal.  Visualized orbits: The visualized orbits appear unremarkable.  Sella and skull base: Normal.  Temporal bones and mastoids: Minimal fluid is also seen in the right mastoid air cells. These may be related to mastoid effusions.      Impression:  1. No acute intracranial process is identified. Details as above.                                         CT Chest Without Contrast (Preliminary result)  Result time 11/12/22 01:24:52      Preliminary result by Alejandro Iglesias MD (11/12/22 01:24:52)                   Narrative:    START OF REPORT:  Technique: CT Scan of the chest was performed without intravenous contrast with direct axial as well as sagittal and, coronal, reconstruction images.    Dosage Information: Automated Exposure Control was utilized.    Comparison: None.    Clinical History: Sob.    Findings:  Soft Tissues: Unremarkable.  Neck: The visualized soft tissues of the neck appear unremarkable.  Mediastinum: The mediastinal structures are within normal limits. There is a small hiatal hernia.  Heart: Coronary artery calcification is seen.  Aorta: Mild aortic calcification is seen in the thoracic aorta.  Pulmonary Arteries: Unremarkable.  Lungs: Subtle scattered streaky linear opacity is seen in the dependent portions at the lung bases consistent with scarring and subsegmental atelectasis. No acute focal infiltrate or consolidation is seen.  Pleura: No effusions or pneumothorax are identified.  Bony Structures:  Spine: Severe multilevel spondylolytic changes are seen in the thoracic spine.  Ribs: No rib fractures are  identified.  Abdomen: The visualized upper abdominal organs appear unremarkable.      Impression:  1. No acute focal infiltrate or consolidation is seen.  2. No acute intrathoracic process identified. Details and other findings as discussed above.                          Preliminary result by [employer], Rad Results In (11/12/22 01:24:52)                   Narrative:    START OF REPORT:  Technique: CT Scan of the chest was performed without intravenous contrast with direct axial as well as sagittal and, coronal, reconstruction images.    Dosage Information: Automated Exposure Control was utilized.    Comparison: None.    Clinical History: Sob.    Findings:  Soft Tissues: Unremarkable.  Neck: The visualized soft tissues of the neck appear unremarkable.  Mediastinum: The mediastinal structures are within normal limits. There is a small hiatal hernia.  Heart: Coronary artery calcification is seen.  Aorta: Mild aortic calcification is seen in the thoracic aorta.  Pulmonary Arteries: Unremarkable.  Lungs: Subtle scattered streaky linear opacity is seen in the dependent portions at the lung bases consistent with scarring and subsegmental atelectasis. No acute focal infiltrate or consolidation is seen.  Pleura: No effusions or pneumothorax are identified.  Bony Structures:  Spine: Severe multilevel spondylolytic changes are seen in the thoracic spine.  Ribs: No rib fractures are identified.  Abdomen: The visualized upper abdominal organs appear unremarkable.      Impression:  1. No acute focal infiltrate or consolidation is seen.  2. No acute intrathoracic process identified. Details and other findings as discussed above.                                         X-Ray Chest 1 View (Final result)  Result time 11/11/22 18:55:48      Final result by Joselyn Skelton MD (11/11/22 18:55:48)                   Impression:      Patchy left basilar opacity may be related to atelectasis or pneumonia.  Recommend follow up PA and  lateral radiographs in 4-6 weeks after completion of appropriate therapy to ensure resolution and exclude persistent opacity, nodule or mass.      Electronically signed by: Joselyn Skelton  Date:    11/11/2022  Time:    18:55               Narrative:    EXAMINATION:  XR CHEST 1 VIEW    CLINICAL HISTORY:  afib;    TECHNIQUE:  Single frontal view of the chest was performed.    COMPARISON:  None    FINDINGS:  LINES AND TUBES: EKG/telemetry leads overlie the chest.    MEDIASTINUM AND MORE: The cardiac silhouette is normal. Aortic atherosclerosis.    LUNGS: Patchy left basilar opacity.    PLEURA:No pleural effusion. No pneumothorax.    OTHER: No acute osseous abnormality.                                       CT Head Without Contrast (Final result)  Result time 11/11/22 18:47:00      Final result by Joselyn Skelton MD (11/11/22 18:47:00)                   Impression:      No appreciable acute intracranial abnormality.    White matter changes and old lacunar infarct most suggestive of chronic small vessel ischemic disease.      Electronically signed by: Joselyn Skelton  Date:    11/11/2022  Time:    18:47               Narrative:    EXAMINATION:  CT HEAD WITHOUT CONTRAST    CLINICAL HISTORY:  Syncope, recurrent;    TECHNIQUE:  Low dose axial CT images obtained throughout the head without intravenous contrast.  Axial, sagittal and coronal reconstructions were performed and interpreted.    DLP: 921 mGycm    All CT scans at this location are performed using dose optimization techniques as appropriate to a performed exam including the following automated exposure control, adjustment of the mA and/or kV according to patient size and/or use of iterative reconstruction technique    COMPARISON:  No relevant prior available for comparison.    FINDINGS:  BRAIN: Gray white differentiation is maintained. Periventricular and subcortical white matter changes most compatible with chronic small vessel ischemic disease.  Old  "lacunar infarct at the right caudate.  Moderate cerebral atrophy.  No hemorrhage. No edema. No mass effect or midline shift.  The posterior fossa and midline structures are unremarkable.  There are calcifications of the cavernous carotid arteries.    VENTRICLES: Normal in size and configuration.    EXTRA-AXIAL: No abnormal extra-axial collections.    BONES: Calvarium is intact.    SINUSES AND MASTOIDS: Left mastoid effusion.                                    X-Rays:   Independently Interpreted Readings:   Head CT: No hemorrhage.   Medications   enoxaparin injection 90 mg (90 mg Subcutaneous Given 11/12/22 0157)   sodium chloride 0.9% flush 10 mL (has no administration in time range)   melatonin tablet 6 mg (6 mg Oral Given 11/12/22 0210)   pantoprazole EC tablet 40 mg (has no administration in time range)   lactated ringers bolus 1,000 mL (0 mLs Intravenous Stopped 11/11/22 2130)   cefTRIAXone (ROCEPHIN) 1 g in dextrose 5 % in water (D5W) 5 % 50 mL IVPB (MB+) (0 g Intravenous Stopped 11/11/22 2245)     Medical Decision Making:   History:   Old Medical Records: I decided to obtain old medical records.  Initial Assessment:   76 yo - presenting for syncopal episode at home witnessed by daughter - foaming at mouth. AOx4 and well appearing in ER. Did smoke marijuana today for first time in "years" workup mostly unremarkable - mild elevated WBC and possible pneumonia on CXR. EKG initially with afib - repeat NSR. Treat with abx, admit for syncope workup   Differential Diagnosis:   Cardiac syncope, ich, acs, electrolyte derangement, infection   Independently Interpreted Test(s):   I have ordered and independently interpreted X-rays - see prior notes.  I have ordered and independently interpreted EKG Reading(s) - see prior notes  Clinical Tests:   Lab Tests: Ordered and Reviewed  Radiological Study: Reviewed and Ordered  Medical Tests: Ordered and Reviewed        Scribe Attestation:   Scribe #1: I performed the above " scribed service and the documentation accurately describes the services I performed. I attest to the accuracy of the note.    Attending Attestation:           Physician Attestation for Scribe:  Physician Attestation Statement for Scribe #1: Jose MERINO IV, MD, reviewed documentation, as scribed by Harry Strickland in my presence, and it is both accurate and complete.           ED Course as of 11/12/22 0302   Fri Nov 11, 2022   2203 Pt admitted to hospitalist at this time.  [ZT]      ED Course User Index  [ZT] Harry Strickland                 Clinical Impression:   Final diagnoses:  [R55] Syncope, unspecified syncope type (Primary)  [E86.0] Dehydration  [I48.0] Paroxysmal atrial fibrillation  [J18.9] Pneumonia of left lower lobe due to infectious organism      ED Disposition Condition    Admit         Jose MERINO MD personally performed the history, PE, MDM, and procedures as documented above and agree with the scribe's documentation.           Jose Villanueva IV, MD  11/12/22 0632

## 2022-11-12 NOTE — CONSULTS
Neurology Consult Note    Patient Name: Yolanda Price  MRN: [id number]  Admission Date: 11/11/2022  Hospital Length of Stay: 1 days  Consulting Provider: Natan Mcneil MD  Primary Care Physician: No primary care provider on file.  Principal Problem:Syncope    Inpatient consult to Neurology  Consult performed by: Natan Mcneil MD  Consult ordered by: Ivan Pacheco MD       Subjective:     Chief Complaint:    Chief Complaint   Patient presents with    Loss of Consciousness     Pt reports per aasi c/o syncopal episode while sitting in chair at home. Pt initially diaphoretic and pale, and reports vomiting x1 since this episode. GCS15     HPI:   Dtr tells me pt complained of feeling poorly and was rocking back and forth in chair then became unresponsive but not foaming from the mouth but maybe drooling or talking jibberish and was pale and diaphoretic   Duration unclear but likely brief with some confusion afterward but was profoundly diaphoretic then vomited     She does not drive     Review of Systems      Current Facility-Administered Medications:     amLODIPine tablet 10 mg, 10 mg, Oral, Daily, Ivan Pacheco MD, 10 mg at 11/12/22 1044    apixaban tablet 5 mg, 5 mg, Oral, BID, Ivan Pacheco MD    ezetimibe tablet 10 mg, 10 mg, Oral, Daily, Ivan Pacheco MD, 10 mg at 11/12/22 1044    hydroCHLOROthiazide tablet 12.5 mg, 12.5 mg, Oral, Daily, Ivan Pacheco MD, 12.5 mg at 11/12/22 1044    ibuprofen tablet 800 mg, 800 mg, Oral, TID, Ivan Pacheco MD, 800 mg at 11/12/22 1044    lactated ringers infusion, , Intravenous, Continuous, Ivan Pacheco MD, Last Rate: 100 mL/hr at 11/12/22 0633, New Bag at 11/12/22 0633    lisinopriL tablet 10 mg, 10 mg, Oral, Daily, Ivan Pacheco MD, 10 mg at 11/12/22 1044    melatonin tablet 6 mg, 6 mg, Oral, Nightly PRN, Parth Montemayor MD, 6 mg at 11/12/22 0210    oxybutynin tablet 5 mg, 5 mg, Oral, Daily, Ivan Pacheco MD, 5 mg at 11/12/22 1044    pantoprazole EC tablet 40 mg, 40  mg, Oral, BID PRN, Parth Montemayor MD, 40 mg at 11/12/22 0314    pantoprazole EC tablet 40 mg, 40 mg, Oral, Daily, Ivan Pacheco MD, 40 mg at 11/12/22 1044    sodium chloride 0.9% flush 10 mL, 10 mL, Intravenous, PRN, Parth Montemayor MD     Objective:        Exam:   General Exam  if accompanied, by__ dtr's   body habitus_ Body mass index is 34.33 kg/m².    mental status_alert and appropriate  Dark shades on as she recently had cataract surgery   Neurological  Speech __ seemed ok   cranial nerves:  CN 2 VF_ok   CN 3, 4, 6 EOMs_ I did not have her remove shades     CN 7_no lower face asymmetry  CN 8_hearing _seemed ok   CN 12 tongue_ok    Motor__ BUE's move well   tremor: _absent  coordination: _ F to N was ok   gait_ she has been walking but I did not observe   _    Neuroimaging:  Images and imaging reports reviewed.  Study / studies: bMRI  Rads summary: 1. No acute intracranial abnormality.  2. Moderate chronic microvascular ischemic changes.  My comments: agree   ...    Labs: admitted to other THC and  urine shows this but only this   BUN/Cr 28/1.1 yest   Better today at 26/0.8        Assessment/Plan:       Problem List Items Addressed This Visit          Other    * (Principal) Syncope - Primary     Other Visit Diagnoses       Loss of consciousness        Dehydration        Paroxysmal atrial fibrillation        Pneumonia of left lower lobe due to infectious organism        A-fib              Active Hospital Problems    Diagnosis    *Syncope     I feel she likely had convulsive syncope and communicated such to hospitalist today Junior      Other comments/ follow up:        I have no specific rec's   Glad she already no longer drives     If EEG gets done today or tomorrow I will read but will likely be unhelpful    Singing off          MD LOLY ZelayaA      [facility]    Apologies for any electronic transcription errors or omissions.

## 2022-11-12 NOTE — PROCEDURES
"Yolanda Price is a 77 y.o. female patient.    Temp: 98.1 °F (36.7 °C) (22)  Pulse: 74 (22)  Resp: 20 (22)  BP: (!) 147/80 (22)  SpO2: 95 % (22)  Weight: 90.7 kg (199 lb 15.3 oz) (22)  Height: 5' 4" (162.6 cm) (22)       EEG    Date/Time: 2022 3:40 PM  Performed by: Natan Mcneil MD  Authorized by: Parth Montemayor MD       2022  EEG REPORT    PATIENT: Yolanda Price  : 1945    DATE:     INTERPRETING PHYSICIAN: Natan Mcneil       Digital EEG reviewed.  Electrodes placed in customary 10-20 fashion.    Video recorded:   _._yes   __no     Duration of recordin  minutes     Patient  awake, drowsy, and asleep.     The resting posterior background activity consists of symmetrical background alpha activity.    Lateralizing, focal, or epileptiform features were not present.    Activations:        HV performed:    __ no         Photic performed and not associated with abnormalities.      Technical character: superb     EKG: sinus     IMPRESSION: This is a normal awake, drowsy, and asleep EEG.     Comments:   The lack of focal or epileptiform features does not negate or exclude a diagnosis of seizure or epilepsy, as the sensitivity of interictal EEG may be < or equal to 50%.    Natan Mcneil MD ZAK    Current Outpatient Medications   Medication Instructions    amLODIPine (NORVASC) 10 mg, Oral, Daily    esomeprazole (NEXIUM) 40 mg, Oral, Daily    ezetimibe (ZETIA) 10 mg, Oral, Daily    ibuprofen (ADVIL,MOTRIN) 800 mg, Oral, 3 times daily    ketorolac 0.5% (ACULAR) 0.5 % Drop 1 drop, Left Eye, 4 times daily    lisinopriL-hydrochlorothiazide (PRINZIDE,ZESTORETIC) 10-12.5 mg per tablet 1 tablet, Oral, Daily    ofloxacin (OCUFLOX) 0.3 % ophthalmic solution 1 drop, Left Eye, 4 times daily    oxybutynin (DITROPAN-XL) 5 mg, Oral, Daily    prednisoLONE acetate (PRED FORTE) 1 % DrpS 1 drop, Left Eye, 4 " times daily

## 2022-11-12 NOTE — PROGRESS NOTES
[facility] MEDICINE  PROGRESS NOTE        CHIEF COMPLAINT   Hospital follow up    HOSPITAL COURSE   77-year-old female with a history of hypertension who was brought to the ER after having a witnessed syncopal/seizure episode.  Daughter explains at bedside patient was sitting down in the chair and then all of a sudden started to make strange noises with her mouth, and began foaming at the mouth.  During this time daughter was trying to get her attention but was unable to.  She does report vocalizations during this time but no general tonic-clonic movements.  She states these lasted for about 5 minutes.  She explains her mother was diaphoretic during this time as well.  She explains her mother did not eat much throughout the day, and is smoking marijuana.  Denies a history of seizures or strokes.  In arrival to the ER patient was afebrile, hemodynamically stable.  Initial EKG showed atrial fibrillation and then repeat EKG showed sinus arrhythmia.  Laboratory work was significant for hemoconcentration and mild BARB.  CT head was unremarkable.  Chest x-ray showed a patchy left basilar opacity.  CT thorax shows no pulmonary nodule, mild dependent atelectasis.  MRI brain without contrast shows no acute intracranial abnormality, moderate chronic ischemic changes.    Today  Seen this morning daughter was at bedside.  No further events.  Waiting for further syncope workup.  Neurology consulted suspected convulsive syncope.        OBJECTIVE/PHYSICAL EXAM     VITAL SIGNS (MOST RECENT):  Temp: 97.7 °F (36.5 °C) (11/12/22 1115)  Pulse: 66 (11/12/22 1115)  Resp: 20 (11/12/22 1115)  BP: 122/71 (11/12/22 1115)  SpO2: 95 % (11/12/22 1115) VITAL SIGNS (24 HOUR RANGE):  Temp:  [97.7 °F (36.5 °C)-98.2 °F (36.8 °C)] 97.7 °F (36.5 °C)  Pulse:  [62-92] 66  Resp:  [16-20] 20  SpO2:  [91 %-100 %] 95 %  BP: (106-143)/(63-78) 122/71   GENERAL: In no acute distress, afebrile  HEENT:  CHEST: Clear to  auscultation bilaterally  HEART: S1, S2, no appreciable murmur  ABDOMEN: Soft, nontender, BS +  MSK: Warm, no lower extremity edema, no clubbing or cyanosis  NEUROLOGIC: Alert and oriented x4, moving all extremities with good strength   INTEGUMENTARY:  PSYCHIATRY:        ASSESSMENT/PLAN   Convulsive syncope   Dehydration   New onset atrial fibrillation-NSR now    History of: Essential hypertension      Neurology signed off.  Suspecting convulsive syncope.    Cardiology consulted for new onset atrial fibrillation.  Started on Eliquis.  Pending echocardiogram.  Resume home medications.  Plan for discharge likely tomorrow morning.    Anticipated discharge and disposition:   __________________________________________________________________________    LABS/MICRO/MEDS/DIAGNOSTICS       LABS  Recent Labs     11/12/22  0525      K 3.2*   CHLORIDE 105   CO2 28   BUN 26.4*   CREATININE 0.81   GLUCOSE 112   CALCIUM 8.9   ALKPHOS 56   AST 14   ALT 18   ALBUMIN 3.7     Recent Labs     11/12/22  0525   WBC 8.7   RBC 4.41   HCT 39.3   MCV 89.1          MICROBIOLOGY  Microbiology Results (last 7 days)       ** No results found for the last 168 hours. **               MEDICATIONS   amLODIPine  10 mg Oral Daily    apixaban  5 mg Oral BID    ezetimibe  10 mg Oral Daily    hydroCHLOROthiazide  12.5 mg Oral Daily    ibuprofen  800 mg Oral TID    lisinopriL  10 mg Oral Daily    oxybutynin  5 mg Oral Daily    pantoprazole  40 mg Oral Daily         INFUSIONS   lactated ringers 100 mL/hr at 11/12/22 0633          DIAGNOSTIC TESTS  MRI Brain Without Contrast   Final Result      1. No acute intracranial abnormality.   2. Moderate chronic microvascular ischemic changes.   No significant change from the [facility] interpretation.         Electronically signed by: Ibeth Paulino   Date:    11/12/2022   Time:    09:27      CT Chest Without Contrast   Final Result      No pulmonary nodule.         Electronically signed by: Joselyn  Costa   Date:    11/12/2022   Time:    09:53      X-Ray Chest 1 View   Final Result      Patchy left basilar opacity may be related to atelectasis or pneumonia.  Recommend follow up PA and lateral radiographs in 4-6 weeks after completion of appropriate therapy to ensure resolution and exclude persistent opacity, nodule or mass.         Electronically signed by: Joselyn Skelton   Date:    11/11/2022   Time:    18:55      CT Head Without Contrast   Final Result      No appreciable acute intracranial abnormality.      White matter changes and old lacunar infarct most suggestive of chronic small vessel ischemic disease.         Electronically signed by: Joselyn Skelton   Date:    11/11/2022   Time:    18:47           No results found for: EF           Case related differential diagnoses have been reviewed; assessment and plan has been documented. I have personally reviewed the labs and test results that are currently available; I have reviewed the patients medication list. I have reviewed the consulting providers recommendations. I have reviewed or attempted to review medical records based upon their availability.  All of the patient's and/or family's questions have been addressed and answered to the best of my ability.  I will continue to monitor closely and make adjustments to medical management as needed.  This document was created using [employer].  Transcription errors may have been made.  Please contact me if any questions may rise regarding documentation to clarify transcription.        Ivan Pacheco MD   11/12/2022   Internal Medicine

## 2022-11-13 VITALS
SYSTOLIC BLOOD PRESSURE: 145 MMHG | BODY MASS INDEX: 34.13 KG/M2 | TEMPERATURE: 98 F | OXYGEN SATURATION: 93 % | HEIGHT: 64 IN | RESPIRATION RATE: 16 BRPM | HEART RATE: 65 BPM | DIASTOLIC BLOOD PRESSURE: 82 MMHG | WEIGHT: 199.94 LBS

## 2022-11-13 LAB
AV INDEX (PROSTH): 0.61
AV MEAN GRADIENT: 4 MMHG
AV PEAK GRADIENT: 8 MMHG
AV VALVE AREA: 1.73 CM2
AV VELOCITY RATIO: 0.59
BSA FOR ECHO PROCEDURE: 2.02 M2
CV ECHO LV RWT: 0.93 CM
DOP CALC AO PEAK VEL: 1.4 M/S
DOP CALC AO VTI: 30.6 CM
DOP CALC LVOT AREA: 2.8 CM2
DOP CALC LVOT DIAMETER: 1.9 CM
DOP CALC LVOT PEAK VEL: 0.83 M/S
DOP CALC LVOT STROKE VOLUME: 52.99 CM3
DOP CALC MV VTI: 38.3 CM
DOP CALCLVOT PEAK VEL VTI: 18.7 CM
E/A RATIO: 1.25
E/E' RATIO: 11.67 M/S
ECHO LV POSTERIOR WALL: 1.65 CM (ref 0.6–1.1)
EJECTION FRACTION: 56 %
FRACTIONAL SHORTENING: 31 % (ref 28–44)
INTERVENTRICULAR SEPTUM: 1.17 CM (ref 0.6–1.1)
LEFT ATRIUM SIZE: 4.1 CM
LEFT ATRIUM VOLUME INDEX MOD: 28.6 ML/M2
LEFT ATRIUM VOLUME MOD: 56 CM3
LEFT INTERNAL DIMENSION IN SYSTOLE: 2.44 CM (ref 2.1–4)
LEFT VENTRICLE DIASTOLIC VOLUME INDEX: 26.68 ML/M2
LEFT VENTRICLE DIASTOLIC VOLUME: 52.3 ML
LEFT VENTRICLE MASS INDEX: 91 G/M2
LEFT VENTRICLE SYSTOLIC VOLUME INDEX: 10.7 ML/M2
LEFT VENTRICLE SYSTOLIC VOLUME: 21 ML
LEFT VENTRICULAR INTERNAL DIMENSION IN DIASTOLE: 3.54 CM (ref 3.5–6)
LEFT VENTRICULAR MASS: 177.73 G
LV LATERAL E/E' RATIO: 9.55 M/S
LV SEPTAL E/E' RATIO: 15 M/S
LVOT MG: 2 MMHG
LVOT MV: 0.62 CM/S
MV MEAN GRADIENT: 2 MMHG
MV PEAK A VEL: 0.84 M/S
MV PEAK E VEL: 1.05 M/S
MV PEAK GRADIENT: 5 MMHG
MV STENOSIS PRESSURE HALF TIME: 88 MS
MV VALVE AREA BY CONTINUITY EQUATION: 1.38 CM2
MV VALVE AREA P 1/2 METHOD: 2.5 CM2
PISA TR MAX VEL: 2.48 M/S
PV PEAK VELOCITY: 0.79 CM/S
RA PRESSURE: 3 MMHG
RA WIDTH: 4.3 CM
RIGHT VENTRICULAR END-DIASTOLIC DIMENSION: 2.37 CM
TDI LATERAL: 0.11 M/S
TDI SEPTAL: 0.07 M/S
TDI: 0.09 M/S
TR MAX PG: 25 MMHG
TRICUSPID ANNULAR PLANE SYSTOLIC EXCURSION: 2.11 CM
TV REST PULMONARY ARTERY PRESSURE: 28 MMHG

## 2022-11-13 PROCEDURE — 63600175 PHARM REV CODE 636 W HCPCS: Performed by: INTERNAL MEDICINE

## 2022-11-13 PROCEDURE — 25000003 PHARM REV CODE 250: Performed by: INTERNAL MEDICINE

## 2022-11-13 RX ADMIN — HYDROCHLOROTHIAZIDE 12.5 MG: 12.5 TABLET ORAL at 08:11

## 2022-11-13 RX ADMIN — EZETIMIBE 10 MG: 10 TABLET ORAL at 08:11

## 2022-11-13 RX ADMIN — AMLODIPINE BESYLATE 10 MG: 5 TABLET ORAL at 08:11

## 2022-11-13 RX ADMIN — OXYBUTYNIN CHLORIDE 5 MG: 5 TABLET ORAL at 08:11

## 2022-11-13 RX ADMIN — APIXABAN 5 MG: 5 TABLET, FILM COATED ORAL at 08:11

## 2022-11-13 RX ADMIN — LISINOPRIL 10 MG: 10 TABLET ORAL at 08:11

## 2022-11-13 RX ADMIN — IBUPROFEN 800 MG: 800 TABLET, FILM COATED ORAL at 08:11

## 2022-11-13 RX ADMIN — PANTOPRAZOLE SODIUM 40 MG: 40 TABLET, DELAYED RELEASE ORAL at 08:11

## 2022-11-13 NOTE — PROGRESS NOTES
[facility] - Observation Unit  Cardiology  Progress Note    Patient Name: Yolanda Price  MRN: [id number]  Admission Date: 11/11/2022  Hospital Length of Stay: 2 days  Code Status: Full Code   Attending Provider: Parth Montemayor MD   Consulting Provider: ALEXUS Raza  Primary Care Physician: No primary care provider on file.  Principal Problem:Convulsive syncope    Patient information was obtained from patient, past medical records, and ER records.     Subjective:     Chief Complaint:  new onset afib     HPI:   Ms. Price is a 77 year old female who was previously known to [facility], Dr. Varma ([id number]). She presents to the ED after having a witnessed syncopal/seizure episode. Her daughter reports that the patient was sitting in the chair, then all of a sudden started foaming at the mouth & making strange noises for about 5 minutes. She reports that she was diaphoretic during this time. Upon arrival to the ED, an EKG that initially demonstrated Afib. A repeat EKG was obtained & demonstrated Sinus arrhythmia. Significant labwork includes WBC 12.8. Troponins were negative x 3. Her UDS was positive for cannabis. [facility] has been consulted for new afib.     11.13.22: NAD. Denies CP, SOB, or palps. SR on tele. Plans for discharge home today.     PMH: HTN  PSH: cataract surgery   Family History:   Social History: Reports marijuana use. Denies alcohol or tobacco use.     Previous Cardiac Diagnostics:   TTE 11.12.22:  The estimated ejection fraction is 56%.  Concentric remodeling and normal systolic function.  Mild right atrial enlargement.  Mild left atrial enlargement.  Normal right ventricular size with normal right ventricular systolic function.  Mild tricuspid regurgitation.  Normal central venous pressure (3 mmHg).  The estimated PA systolic pressure is 28 mmHg.    BL Carotid US 11.12.22:  The right internal carotid artery demonstrated less than 50% stenosis.  The left internal carotid  artery demonstrated no hemodynamically significant stenosis.   Bilateral vertebral arteries were patent with antegrade flow.    LHC 10.2.17:  Normal coronaries, false positive stress test, mild PAD.    TTE 9.14.17:  The study quality is good.   Global left ventricular systolic function is normal. The left ventricular ejection fraction is 65%. Left ventricular diastolic function is normal.   Trace mitral and tricuspid regurgitation.   Mild calcification of the aortic valve is noted with adequate cuspal excursion.   Mild mitral annular calcification is noted.   The estimated pulmonary artery systolic pressure is 27 mmHg assuming a right atrial pressure of 5 mmHg.     Review of patient's allergies indicates:  Not on File    Review of Systems   Respiratory:  Negative for shortness of breath.    Cardiovascular:  Negative for chest pain and palpitations.     Objective:     Vital Signs (Most Recent):  Temp: 98.1 °F (36.7 °C) (11/13/22 0713)  Pulse: 65 (11/13/22 0713)  Resp: 16 (11/13/22 0713)  BP: (!) 145/82 (11/13/22 0713)  SpO2: (!) 93 % (11/13/22 0713) Vital Signs (24h Range):  Temp:  [97.4 °F (36.3 °C)-98.4 °F (36.9 °C)] 98.1 °F (36.7 °C)  Pulse:  [65-74] 65  Resp:  [16-20] 16  SpO2:  [93 %-97 %] 93 %  BP: (113-147)/(67-82) 145/82     Weight: 90.7 kg (199 lb 15.3 oz)  Body mass index is 34.32 kg/m².    SpO2: (!) 93 %  O2 Device (Oxygen Therapy): room air      Intake/Output Summary (Last 24 hours) at 11/13/2022 0951  Last data filed at 11/12/2022 1700  Gross per 24 hour   Intake 640 ml   Output 500 ml   Net 140 ml       Lines/Drains/Airways       None                   Significant Labs:  Recent Results (from the past 72 hour(s))   CBC with Differential    Collection Time: 11/11/22  6:36 PM   Result Value Ref Range    WBC 12.8 (H) 4.5 - 11.5 x10(3)/mcL    RBC 5.52 (H) 4.20 - 5.40 x10(6)/mcL    Hgb 17.0 (H) 12.0 - 16.0 gm/dL    Hct 48.9 (H) 37.0 - 47.0 %    MCV 88.6 80.0 - 94.0 fL    MCH 30.8 27.0 - 31.0 pg    MCHC 34.8  33.0 - 36.0 mg/dL    RDW 12.2 11.5 - 17.0 %    Platelet 235 130 - 400 x10(3)/mcL    MPV 12.0 (H) 7.4 - 10.4 fL    Neut % 73.9 %    Lymph % 18.3 %    Mono % 6.7 %    Eos % 0.3 %    Basophil % 0.5 %    Lymph # 2.34 0.6 - 4.6 x10(3)/mcL    Neut # 9.4 (H) 2.1 - 9.2 x10(3)/mcL    Mono # 0.86 0.1 - 1.3 x10(3)/mcL    Eos # 0.04 0 - 0.9 x10(3)/mcL    Baso # 0.07 0 - 0.2 x10(3)/mcL    IG# 0.04 0 - 0.04 x10(3)/mcL    IG% 0.3 %    NRBC% 0.0 %   Comprehensive Metabolic Panel    Collection Time: 11/11/22  6:36 PM   Result Value Ref Range    Sodium Level 141 136 - 145 mmol/L    Potassium Level 4.1 3.5 - 5.1 mmol/L    Chloride 103 98 - 107 mmol/L    Carbon Dioxide 26 23 - 31 mmol/L    Glucose Level 131 (H) 82 - 115 mg/dL    Blood Urea Nitrogen 28.2 (H) 9.8 - 20.1 mg/dL    Creatinine 1.11 (H) 0.55 - 1.02 mg/dL    Calcium Level Total 9.6 8.4 - 10.2 mg/dL    Protein Total 7.7 (H) 5.8 - 7.6 gm/dL    Albumin Level 4.3 3.4 - 4.8 gm/dL    Globulin 3.4 2.4 - 3.5 gm/dL    Albumin/Globulin Ratio 1.3 1.1 - 2.0 ratio    Bilirubin Total 0.8 <=1.5 mg/dL    Alkaline Phosphatase 69 40 - 150 unit/L    Alanine Aminotransferase 25 0 - 55 unit/L    Aspartate Aminotransferase 22 5 - 34 unit/L    eGFR 51 mls/min/1.73/m2   Magnesium    Collection Time: 11/11/22  6:36 PM   Result Value Ref Range    Magnesium Level 1.60 1.60 - 2.60 mg/dL   TSH    Collection Time: 11/11/22  6:36 PM   Result Value Ref Range    Thyroid Stimulating Hormone 0.6048 0.3500 - 4.9400 uIU/mL   Protime-INR    Collection Time: 11/11/22  6:36 PM   Result Value Ref Range    PT 13.0 12.5 - 14.5 seconds    INR 0.99 0.00 - 1.30   APTT    Collection Time: 11/11/22  6:36 PM   Result Value Ref Range    PTT 26.1 23.2 - 33.7 seconds   Troponin I    Collection Time: 11/11/22  6:36 PM   Result Value Ref Range    Troponin-I <0.010 0.000 - 0.045 ng/mL   T4, Free    Collection Time: 11/11/22  6:36 PM   Result Value Ref Range    Thyroxine Free 1.08 0.70 - 1.48 ng/dL   Hemoglobin A1C    Collection  Time: 11/12/22 12:37 AM   Result Value Ref Range    Hemoglobin A1c 6.2 <=7.0 %    Estimated Average Glucose 131.2 mg/dL   Troponin I    Collection Time: 11/12/22 12:37 AM   Result Value Ref Range    Troponin-I 0.015 0.000 - 0.045 ng/mL   Comprehensive metabolic panel    Collection Time: 11/12/22  5:25 AM   Result Value Ref Range    Sodium Level 143 136 - 145 mmol/L    Potassium Level 3.2 (L) 3.5 - 5.1 mmol/L    Chloride 105 98 - 107 mmol/L    Carbon Dioxide 28 23 - 31 mmol/L    Glucose Level 112 82 - 115 mg/dL    Blood Urea Nitrogen 26.4 (H) 9.8 - 20.1 mg/dL    Creatinine 0.81 0.55 - 1.02 mg/dL    Calcium Level Total 8.9 8.4 - 10.2 mg/dL    Protein Total 6.1 5.8 - 7.6 gm/dL    Albumin Level 3.7 3.4 - 4.8 gm/dL    Globulin 2.4 2.4 - 3.5 gm/dL    Albumin/Globulin Ratio 1.5 1.1 - 2.0 ratio    Bilirubin Total 0.8 <=1.5 mg/dL    Alkaline Phosphatase 56 40 - 150 unit/L    Alanine Aminotransferase 18 0 - 55 unit/L    Aspartate Aminotransferase 14 5 - 34 unit/L    eGFR >60 mls/min/1.73/m2   Troponin I    Collection Time: 11/12/22  5:25 AM   Result Value Ref Range    Troponin-I <0.010 0.000 - 0.045 ng/mL   CBC with Differential    Collection Time: 11/12/22  5:25 AM   Result Value Ref Range    WBC 8.7 4.5 - 11.5 x10(3)/mcL    RBC 4.41 4.20 - 5.40 x10(6)/mcL    Hgb 13.5 12.0 - 16.0 gm/dL    Hct 39.3 37.0 - 47.0 %    MCV 89.1 80.0 - 94.0 fL    MCH 30.6 27.0 - 31.0 pg    MCHC 34.4 33.0 - 36.0 mg/dL    RDW 12.3 11.5 - 17.0 %    Platelet 189 130 - 400 x10(3)/mcL    MPV 12.8 (H) 7.4 - 10.4 fL    Neut % 55.9 %    Lymph % 34.5 %    Mono % 7.6 %    Eos % 1.1 %    Basophil % 0.6 %    Lymph # 3.00 0.6 - 4.6 x10(3)/mcL    Neut # 4.9 2.1 - 9.2 x10(3)/mcL    Mono # 0.66 0.1 - 1.3 x10(3)/mcL    Eos # 0.10 0 - 0.9 x10(3)/mcL    Baso # 0.05 0 - 0.2 x10(3)/mcL    IG# 0.03 0 - 0.04 x10(3)/mcL    IG% 0.3 %    NRBC% 0.0 %   Drug Screen, Urine    Collection Time: 11/12/22  9:23 AM   Result Value Ref Range    Amphetamines, Urine Negative Negative     Barbituates, Urine Negative Negative    Benzodiazepine, Urine Negative Negative    Cannabinoids, Urine Positive (A) Negative    Cocaine, Urine Negative Negative    Fentanyl, Urine Negative Negative    MDMA, Urine Negative Negative    Opiates, Urine Negative Negative    Phencyclidine, Urine Negative Negative    pH, Urine 5.5 3.0 - 11.0    Specific Gravity, Urine Auto 1.022 1.001 - 1.035   Urinalysis, Reflex to Urine Culture Urine, Clean Catch    Collection Time: 11/12/22  9:24 AM    Specimen: Urine   Result Value Ref Range    Color, UA Yellow Yellow, Light-Yellow, Dark Yellow, [place], Straw    Appearance, UA Clear Clear    Specific Gravity, UA 1.022 1.001 - 1.030    pH, UA 5.5 5.0 - 8.5    Protein, UA Negative Negative mg/dL    Glucose, UA Negative Negative, Normal mg/dL    Ketones, UA Negative Negative mg/dL    Blood, UA Negative Negative unit/L    Bilirubin, UA Negative Negative mg/dL    Urobilinogen, UA 0.2 0.2, 1.0, Normal mg/dL    Nitrites, UA Positive (A) Negative    Leukocyte Esterase, UA Trace (A) Negative unit/L   Urinalysis, Microscopic    Collection Time: 11/12/22  9:24 AM   Result Value Ref Range    RBC, UA <5 <=5 /HPF    WBC, UA 10 (H) <=5 /HPF    Squamous Epithelial Cells, UA <5 <=5 /HPF    Bacteria, UA None Seen None Seen, Rare, Occasional /HPF   CV Ultrasound Bilateral Doppler Carotid    Collection Time: 11/12/22  9:56 AM   Result Value Ref Range    Left ICA/CCA ratio 0.89     Right ICA/CCA ratio 0.93     Left Highest ICA 67.00     Left Highest CCA 75     Right Highest ICA 63.00     Right Highest CCA 73     Right Highest EDV 15.00     LT Highest EDV 16.00     Right CCA prox sys 73 cm/s    Right CCA prox storey 12 cm/s    Right CCA dist sys 68 cm/s    Right CCA dist storey 12 cm/s    Right ICA prox sys 45 cm/s    Right ICA prox storey 8 cm/s    Right ICA mid sys 62 cm/s    Right ICA mid storey 15 cm/s    Right ICA dist sys 63 cm/s    Right ICA dist storey 14 cm/s    Right ECA sys 85 cm/s    Right vertebral sys  37 cm/s    Left CCA prox sys 66 cm/s    Left CCA prox storey 11 cm/s    Left CCA dist sys 75 cm/s    Left CCA dist storey 13 cm/s    Left ICA prox sys 46 cm/s    Left ICA prox storey 9 cm/s    Left ICA mid sys 60 cm/s    Left ICA mid storey 16 cm/s    Left ICA dist sys 67 cm/s    Left ICA dist storey 16 cm/s    Left ECA sys 115 cm/s    Left vertebral sys 47 cm/s    Right ECA storey 6 cm/s    Left vertebral storey 5 cm/s    Left ECA storey 4 cm/s   Echo    Collection Time: 11/12/22  3:25 PM   Result Value Ref Range    BSA 2.02 m2    TDI SEPTAL 0.07 m/s    LV LATERAL E/E' RATIO 9.55 m/s    LV SEPTAL E/E' RATIO 15.00 m/s    Right Atrial Pressure (from IVC) 3 mmHg    EF 56 %    Left Ventricular Outflow Tract Mean Velocity 0.62 cm/s    Left Ventricular Outflow Tract Mean Gradient 2.00 mmHg    TDI LATERAL 0.11 m/s    PV PEAK VELOCITY 0.79 cm/s    LVIDd 3.54 3.5 - 6.0 cm    IVS 1.17 (A) 0.6 - 1.1 cm    Posterior Wall 1.65 (A) 0.6 - 1.1 cm    LVIDs 2.44 2.1 - 4.0 cm    FS 31 28 - 44 %    LV mass 177.73 g    LA size 4.10 cm    RVDD 2.37 cm    TAPSE 2.11 cm    Left Ventricle Relative Wall Thickness 0.93 cm    AV mean gradient 4 mmHg    AV valve area 1.73 cm2    AV Velocity Ratio 0.59     AV index (prosthetic) 0.61     MV mean gradient 2 mmHg    MV valve area p 1/2 method 2.50 cm2    MV valve area by continuity eq 1.38 cm2    E/A ratio 1.25     Mean e' 0.09 m/s    LVOT diameter 1.90 cm    LVOT area 2.8 cm2    LVOT peak jose r 0.83 m/s    LVOT peak VTI 18.70 cm    Ao peak jose r 1.40 m/s    Ao VTI 30.6 cm    LVOT stroke volume 52.99 cm3    AV peak gradient 8 mmHg    MV peak gradient 5 mmHg    TV rest pulmonary artery pressure 28 mmHg    E/E' ratio 11.67 m/s    MV Peak E Jose R 1.05 m/s    TR Max Jose R 2.48 m/s    MV VTI 38.3 cm    MV stenosis pressure 1/2 time 88.00 ms    MV Peak A Jose R 0.84 m/s    LV Systolic Volume 21.00 mL    LV Systolic Volume Index 10.7 mL/m2    LV Diastolic Volume 52.30 mL    LV Diastolic Volume Index 26.68 mL/m2    LV Mass Index 91  g/m2    Triscuspid Valve Regurgitation Peak Gradient 25 mmHg    LA Volume Index (Mod) 28.6 mL/m2    LA volume (mod) 56.00 cm3    RA Width 4.30 cm       Significant Imaging:  Imaging Results              MRI Brain Without Contrast (Final result)  Result time 11/12/22 09:27:16      Final result by Ibeth Paulino MD (11/12/22 09:27:16)                   Impression:      1. No acute intracranial abnormality.  2. Moderate chronic microvascular ischemic changes.  [facility].      Electronically signed by: Ibeth Paulino  Date:    11/12/2022  Time:    09:27               Narrative:    EXAMINATION:  MRI BRAIN WITHOUT CONTRAST    CLINICAL HISTORY:  Seizure, new-onset, no history of trauma;    TECHNIQUE:  Multiplanar, multisequence MR images of the brain were obtained without the administration of intravenous contrast.    COMPARISON:  CT head dated 11/11/2022    FINDINGS:  There is no restricted diffusion, acute hemorrhage or edema.  There are moderate patchy T2/FLAIR hyperintensities in the subcortical and periventricular white matter, likely representing chronic microvascular ischemic changes.  There have been prior lacunar infarcts in the right basal ganglia with hemosiderin staining.  The hippocampi are similar in size and signal.    There is no mass effect or midline shift.  The basal cisterns are patent.  There is moderate diffuse parenchymal volume loss.  There is no hydrocephalus or abnormal extra-axial fluid collection.  The major intracranial flow voids are patent.  The paranasal sinuses are clear.  There are bilateral mastoid effusions.                        Preliminary result by Ibeth Paulino MD (11/12/22 02:49:31)                   Narrative:    START OF REPORT:  Technique: Multiplanar, multisequence magnetic resonance imaging of the brain was performed without intravenous contrast.    Comparison: Correlation is with CT study dated2022-11-11  18:41:43.    Clinical history: LOC, possible seizure.    Findings:  Hemorrhage: No acute intracranial hemorrhage is identified.  Stroke: No abnormal signal is identified on the diffusion images to suggest acute infarct.  Extra axial spaces: The ventricles and sulci and basal cisterns all appear moderately prominent consistent with global cerebral atrophy.  Cerebral, cerebellar, and brainstem parenchyma: Moderate periventricular and subcortical white matter signal abnormalities are seen. The main consideration is small vessel ischemic changes. Again noted is an old lacunar infarct in the right basal ganglia (series 7, image 12).  Cranial nerves: Normal.  Herniation: None.  Calvarium: Within normal limits.  Vascular system: Normal flow voids.  Visualized paranasal sinuses: Normal.  Visualized orbits: The visualized orbits appear unremarkable.  Sella and skull base: Normal.  Temporal bones and mastoids: Minimal fluid is also seen in the right mastoid air cells. These may be related to mastoid effusions.      Impression:  1. No acute intracranial process is identified. Details as above.                          Preliminary result by Alejandro Iglesias MD (11/12/22 02:49:31)                   Narrative:    START OF REPORT:  Technique: Multiplanar, multisequence magnetic resonance imaging of the brain was performed without intravenous contrast.    Comparison: Correlation is with CT study dated2022-11-11 18:41:43.    Clinical history: LOC, possible seizure.    Findings:  Hemorrhage: No acute intracranial hemorrhage is identified.  Stroke: No abnormal signal is identified on the diffusion images to suggest acute infarct.  Extra axial spaces: The ventricles and sulci and basal cisterns all appear moderately prominent consistent with global cerebral atrophy.  Cerebral, cerebellar, and brainstem parenchyma: Moderate periventricular and subcortical white matter signal abnormalities are seen. The main consideration is small vessel  ischemic changes. Again noted is an old lacunar infarct in the right basal ganglia (series 7, image 12).  Cranial nerves: Normal.  Herniation: None.  Calvarium: Within normal limits.  Vascular system: Normal flow voids.  Visualized paranasal sinuses: Normal.  Visualized orbits: The visualized orbits appear unremarkable.  Sella and skull base: Normal.  Temporal bones and mastoids: Minimal fluid is also seen in the right mastoid air cells. These may be related to mastoid effusions.      Impression:  1. No acute intracranial process is identified. Details as above.                                         CT Chest Without Contrast (Final result)  Result time 11/12/22 09:53:54      Final result by Joselyn Skelton MD (11/12/22 09:53:54)                   Impression:      No pulmonary nodule.      Electronically signed by: Joselyn Skelton  Date:    11/12/2022  Time:    09:53               Narrative:    EXAMINATION:  CT CHEST WITHOUT CONTRAST    CLINICAL HISTORY:  Abnormal xray - lung nodule, >= 1 cm;    TECHNIQUE:  Helically acquired axial images, sagittal and coronal reformations were obtained from the thoracic inlet to the lung bases without the IV administration of contrast.    Automated tube current modulation, weight-based exposure dosing, and/or iterative reconstruction technique utilized to reach lowest reasonably achievable exposure rate.    DLP: 711 mGy*cm    COMPARISON:  No relevant priors available for comparison at time of this dictation    FINDINGS:  BASE OF NECK: No significant abnormality.    AORTA: Aortic atherosclerosis.    HEART: Normal size. No effusion. There are coronary artery calcifications.    MORE/MEDIASTINUM: No enlarged lymph nodes by size criteria.  Evaluation of hilar lymphadenopathy is limited without intravenous contrast. Small sliding hiatal hernia.    AIRWAYS: Patent.    LUNGS: Mild dependent atelectasis.  No pulmonary nodule.    PLEURA: No pleural effusion or pneumothorax.    UPPER  ABDOMEN: No abnormality of the partially imaged upper abdomen.    THORACIC SOFT TISSUES: Probable sebaceous cyst at the upper back to the right of midline.    BONES: Thoracic spondylosis.                        Preliminary result by Joselyn Skelton MD (11/12/22 01:24:52)                   Narrative:    START OF REPORT:  Technique: CT Scan of the chest was performed without intravenous contrast with direct axial as well as sagittal and, coronal, reconstruction images.    Dosage Information: Automated Exposure Control was utilized.    Comparison: None.    Clinical History: Sob.    Findings:  Soft Tissues: Unremarkable.  Neck: The visualized soft tissues of the neck appear unremarkable.  Mediastinum: The mediastinal structures are within normal limits. There is a small hiatal hernia.  Heart: Coronary artery calcification is seen.  Aorta: Mild aortic calcification is seen in the thoracic aorta.  Pulmonary Arteries: Unremarkable.  Lungs: Subtle scattered streaky linear opacity is seen in the dependent portions at the lung bases consistent with scarring and subsegmental atelectasis. No acute focal infiltrate or consolidation is seen.  Pleura: No effusions or pneumothorax are identified.  Bony Structures:  Spine: Severe multilevel spondylolytic changes are seen in the thoracic spine.  Ribs: No rib fractures are identified.  Abdomen: The visualized upper abdominal organs appear unremarkable.      Impression:  1. No acute focal infiltrate or consolidation is seen.  2. No acute intrathoracic process identified. Details and other findings as discussed above.                          Preliminary result by Alejandro Iglesias MD (11/12/22 01:24:52)                   Narrative:    START OF REPORT:  Technique: CT Scan of the chest was performed without intravenous contrast with direct axial as well as sagittal and, coronal, reconstruction images.    Dosage Information: Automated Exposure Control was utilized.    Comparison:  None.    Clinical History: Sob.    Findings:  Soft Tissues: Unremarkable.  Neck: The visualized soft tissues of the neck appear unremarkable.  Mediastinum: The mediastinal structures are within normal limits. There is a small hiatal hernia.  Heart: Coronary artery calcification is seen.  Aorta: Mild aortic calcification is seen in the thoracic aorta.  Pulmonary Arteries: Unremarkable.  Lungs: Subtle scattered streaky linear opacity is seen in the dependent portions at the lung bases consistent with scarring and subsegmental atelectasis. No acute focal infiltrate or consolidation is seen.  Pleura: No effusions or pneumothorax are identified.  Bony Structures:  Spine: Severe multilevel spondylolytic changes are seen in the thoracic spine.  Ribs: No rib fractures are identified.  Abdomen: The visualized upper abdominal organs appear unremarkable.      Impression:  1. No acute focal infiltrate or consolidation is seen.  2. No acute intrathoracic process identified. Details and other findings as discussed above.                                         X-Ray Chest 1 View (Final result)  Result time 11/11/22 18:55:48      Final result by Joselyn Skelton MD (11/11/22 18:55:48)                   Impression:      Patchy left basilar opacity may be related to atelectasis or pneumonia.  Recommend follow up PA and lateral radiographs in 4-6 weeks after completion of appropriate therapy to ensure resolution and exclude persistent opacity, nodule or mass.      Electronically signed by: Joselyn Skelton  Date:    11/11/2022  Time:    18:55               Narrative:    EXAMINATION:  XR CHEST 1 VIEW    CLINICAL HISTORY:  afib;    TECHNIQUE:  Single frontal view of the chest was performed.    COMPARISON:  None    FINDINGS:  LINES AND TUBES: EKG/telemetry leads overlie the chest.    MEDIASTINUM AND MORE: The cardiac silhouette is normal. Aortic atherosclerosis.    LUNGS: Patchy left basilar opacity.    PLEURA:No pleural effusion.  No pneumothorax.    OTHER: No acute osseous abnormality.                                       CT Head Without Contrast (Final result)  Result time 11/11/22 18:47:00      Final result by Joselyn Skelton MD (11/11/22 18:47:00)                   Impression:      No appreciable acute intracranial abnormality.    White matter changes and old lacunar infarct most suggestive of chronic small vessel ischemic disease.      Electronically signed by: Joselyn Skelton  Date:    11/11/2022  Time:    18:47               Narrative:    EXAMINATION:  CT HEAD WITHOUT CONTRAST    CLINICAL HISTORY:  Syncope, recurrent;    TECHNIQUE:  Low dose axial CT images obtained throughout the head without intravenous contrast.  Axial, sagittal and coronal reconstructions were performed and interpreted.    DLP: 921 mGycm    All CT scans at this location are performed using dose optimization techniques as appropriate to a performed exam including the following automated exposure control, adjustment of the mA and/or kV according to patient size and/or use of iterative reconstruction technique    COMPARISON:  No relevant prior available for comparison.    FINDINGS:  BRAIN: Gray white differentiation is maintained. Periventricular and subcortical white matter changes most compatible with chronic small vessel ischemic disease.  Old lacunar infarct at the right caudate.  Moderate cerebral atrophy.  No hemorrhage. No edema. No mass effect or midline shift.  The posterior fossa and midline structures are unremarkable.  There are calcifications of the cavernous carotid arteries.    VENTRICLES: Normal in size and configuration.    EXTRA-AXIAL: No abnormal extra-axial collections.    BONES: Calvarium is intact.    SINUSES AND MASTOIDS: Left mastoid effusion.                                      EKG:    Results for orders placed or performed during the hospital encounter of 11/11/22   EKG 12-lead    Narrative    Test Reason : R40.20,    Vent. Rate : 104  BPM     Atrial Rate : 000 BPM     P-R Int : 000 ms          QRS Dur : 084 ms      QT Int : 366 ms       P-R-T Axes : 000 -25 103 degrees     QTc Int : 481 ms    Atrial fibrillation with rapid ventricular response  Nonspecific T wave abnormality  Abnormal ECG  No previous ECGs available  Confirmed by Migue Lozada MD ([id number]) on 11/11/2022 5:58:07 PM    Referred By:             Confirmed By:Migue Lozada MD       Telemetry:  SR 80's    Physical Exam  HENT:      Head: Normocephalic.      Nose: Nose normal.      Mouth/Throat:      Mouth: Mucous membranes are moist.   Eyes:      Extraocular Movements: Extraocular movements intact.   Cardiovascular:      Rate and Rhythm: Normal rate and regular rhythm.      Pulses: Normal pulses.      Heart sounds: Normal heart sounds.   Pulmonary:      Effort: Pulmonary effort is normal.      Breath sounds: Normal breath sounds.   Abdominal:      Palpations: Abdomen is soft.   Skin:     General: Skin is warm and dry.   Neurological:      Mental Status: She is alert and oriented to person, place, and time.   Psychiatric:         Behavior: Behavior normal.       Home Medications:   No current facility-administered medications on file prior to encounter.     Current Outpatient Medications on File Prior to Encounter   Medication Sig Dispense Refill    amLODIPine (NORVASC) 10 MG tablet Take 10 mg by mouth once daily.      esomeprazole (NEXIUM) 40 MG capsule Take 40 mg by mouth once daily.      ezetimibe (ZETIA) 10 mg tablet Take 10 mg by mouth once daily.      ibuprofen (ADVIL,MOTRIN) 800 MG tablet Take 800 mg by mouth 3 (three) times daily.      ketorolac 0.5% (ACULAR) 0.5 % Drop Place 1 drop into the left eye 4 (four) times daily.      lisinopriL-hydrochlorothiazide (PRINZIDE,ZESTORETIC) 10-12.5 mg per tablet Take 1 tablet by mouth once daily.      ofloxacin (OCUFLOX) 0.3 % ophthalmic solution Place 1 drop into the left eye 4 (four) times daily.      oxybutynin (DITROPAN-XL) 5 MG TR24 Take  5 mg by mouth once daily.      prednisoLONE acetate (PRED FORTE) 1 % DrpS Place 1 drop into the left eye 4 (four) times daily.         Current Inpatient Medications:    Current Facility-Administered Medications:     aluminum-magnesium hydroxide-simethicone 200-200-20 mg/5 mL suspension 30 mL, 30 mL, Oral, Q6H PRN, Ivan Pacheco MD, 30 mL at 11/12/22 1627    amLODIPine tablet 10 mg, 10 mg, Oral, Daily, Ivan Pacheco MD, 10 mg at 11/13/22 0849    apixaban tablet 5 mg, 5 mg, Oral, BID, Ivan Pacheco MD, 5 mg at 11/13/22 0849    ezetimibe tablet 10 mg, 10 mg, Oral, Daily, Ivan Pacheco MD, 10 mg at 11/13/22 0848    hydroCHLOROthiazide tablet 12.5 mg, 12.5 mg, Oral, Daily, Ivan Pacheco MD, 12.5 mg at 11/13/22 0848    ibuprofen tablet 800 mg, 800 mg, Oral, TID, Ivan Pacheco MD, 800 mg at 11/13/22 0848    lactated ringers infusion, , Intravenous, Continuous, Ivan Pacheco MD, Stopped at 11/13/22 0848    lisinopriL tablet 10 mg, 10 mg, Oral, Daily, Ivan Pacheco MD, 10 mg at 11/13/22 0849    melatonin tablet 6 mg, 6 mg, Oral, Nightly PRN, Parth Montemayor MD, 6 mg at 11/12/22 2036    oxybutynin tablet 5 mg, 5 mg, Oral, Daily, Ivan Pacheco MD, 5 mg at 11/13/22 0849    pantoprazole EC tablet 40 mg, 40 mg, Oral, BID PRN, Parth Montemayor MD, 40 mg at 11/12/22 0314    pantoprazole EC tablet 40 mg, 40 mg, Oral, Daily, Ivan Pacheco MD, 40 mg at 11/13/22 0849    sodium chloride 0.9% flush 10 mL, 10 mL, Intravenous, PRN, Parth Montemayor MD         VTE Risk Mitigation (From admission, onward)           Ordered     apixaban tablet 5 mg  2 times daily         11/12/22 0614                    Assessment:   New onset atrial fibrillation - now SR    - CHADSVASC Score 4 Points  Convulsive syncope  Dehydration - resolved  HTN    Plan:   Echo reviewed. Normal LVEF.  Eliquis started by primary for stroke prevention in the setting of PAF & elevated CHADSVASC Score.  F/U with [facility] in 1-2 weeks.  Will be available as needed.      ALEXUS Raza  Cardiology  [facility]  11/13/2022 12:22 PM

## 2022-11-13 NOTE — NURSING
Pt DC per MD order. PIC removed and covered with gauze and tape. DC instructions reviewed with pt and daughter who verbalized understanding. Prescriptions sent to preferred pharmacy. PT DC per hospital transporter downstairs to private vehicle. Pt in stable condition.

## 2022-11-13 NOTE — DISCHARGE SUMMARY
[facility]  HOSPITAL MEDICINE   DISCHARGE SUMMARY    Patient Name: Yolanda Price  MRN: [id number]  Admission Date: 11/11/2022  Hospital Length of Stay: 2 days  Discharge Date and Time: 11/13/2022  Discharge Provider: Ivan Pacheco  Primary Care Provider: No primary care provider on file.      HOSPITAL COURSE   77-year-old female with a history of hypertension who was brought to the ER after having a witnessed syncopal/seizure episode.  Daughter explains at bedside patient was sitting down in the chair and then all of a sudden started to make strange noises with her mouth, and began foaming at the mouth.  During this time daughter was trying to get her attention but was unable to.  She does report vocalizations during this time but no general tonic-clonic movements.  She states these lasted for about 5 minutes.  She explains her mother was diaphoretic during this time as well.  She explains her mother did not eat much throughout the day, and is smoking marijuana.  Denies a history of seizures or strokes.  In arrival to the ER patient was afebrile, hemodynamically stable.  Initial EKG showed atrial fibrillation and then repeat EKG showed sinus arrhythmia.  Laboratory work was significant for hemoconcentration and mild BARB.  CT head was unremarkable.  Chest x-ray showed a patchy left basilar opacity.  CT thorax shows no pulmonary nodule, mild dependent atelectasis.  MRI brain without contrast shows no acute intracranial abnormality, moderate chronic ischemic changes.  EEG was negative.  Ultrasound carotids did not reveal any significant stenosis.  Neurology consulted and evaluated suspected convulsive syncope and no further recommendations.  Also patient was noted to have new onset atrial fibrillation while being admitted and [facility] was consulted recommended anticoagulation and outpatient follow-up.  Otherwise no further recurrence of the issue and she is doing well plan for discharge  home today.          PHYSICAL EXAM     Most Recent Vital Signs:  Temp: 98.1 °F (36.7 °C) (11/13/22 0713)  Pulse: 65 (11/13/22 0713)  Resp: 16 (11/13/22 0713)  BP: (!) 145/82 (11/13/22 0713)  SpO2: (!) 93 % (11/13/22 0713)     GENERAL: In no acute distress, afebrile  HEENT:  CHEST: Clear to auscultation bilaterally  HEART: S1, S2, no appreciable murmur  ABDOMEN: Soft, nontender, BS +  MSK: Warm, no lower extremity edema, no clubbing or cyanosis  NEUROLOGIC: Alert and oriented x4, moving all extremities with good strength   INTEGUMENTARY:  PSYCHIATRY:          DISCHARGE DIAGNOSIS   Convulsive syncope   Dehydration   New onset atrial fibrillation-NSR now     History of: Essential hypertension  _____________________________________________________________________________      DISCHARGE MED REC     Current Discharge Medication List        START taking these medications    Details   apixaban (ELIQUIS) 5 mg Tab Take 1 tablet (5 mg total) by mouth 2 (two) times daily.  Qty: 60 tablet, Refills: 0           CONTINUE these medications which have NOT CHANGED    Details   amLODIPine (NORVASC) 10 MG tablet Take 10 mg by mouth once daily.      esomeprazole (NEXIUM) 40 MG capsule Take 40 mg by mouth once daily.      ezetimibe (ZETIA) 10 mg tablet Take 10 mg by mouth once daily.      ibuprofen (ADVIL,MOTRIN) 800 MG tablet Take 800 mg by mouth 3 (three) times daily.      ketorolac 0.5% (ACULAR) 0.5 % Drop Place 1 drop into the left eye 4 (four) times daily.      lisinopriL-hydrochlorothiazide (PRINZIDE,ZESTORETIC) 10-12.5 mg per tablet Take 1 tablet by mouth once daily.      ofloxacin (OCUFLOX) 0.3 % ophthalmic solution Place 1 drop into the left eye 4 (four) times daily.      oxybutynin (DITROPAN-XL) 5 MG TR24 Take 5 mg by mouth once daily.      prednisoLONE acetate (PRED FORTE) 1 % DrpS Place 1 drop into the left eye 4 (four) times daily.                CONSULTS     Consults (From admission, onward)          Status Ordering  Provider     Inpatient consult to Cardiology  Once        Provider:  Migue Lozada MD    Completed FLO AREVALO     Inpatient consult to Neurology  Once        Provider:  Natan Mcneil MD    Completed FLO AREVALO              FOLLOW UP      Follow-up Information       Primary care provider Follow up in 1 week(s).               Jaime Varma MD Follow up in 2 week(s).    Specialty: Cardiovascular Disease  Contact information:  [address] [place] [address]  [phone]                                 DISCHARGE INSTRUCTIONS     Explained in detail to the patient about the discharge plan, medications, and follow-up visits. Pt understands and agrees with the treatment plan.  Discharged Condition: stable  Diet as tolerated  Activities as tolerated  Discharge to:  Home     TIME SPENT ON DISCHARGE   35 minutes        Flo Lenz M.D on 11/13/2022  Internal Medicine  Department of [facility] Medicine    This document was created using electronic dictation services.  Please excuse any errors that may have been made.  Contact me if any questions regarding documentation to clarify verbiage.

## 2022-11-15 LAB
LEFT CCA DIST DIAS: 13 CM/S
LEFT CCA DIST SYS: 75 CM/S
LEFT CCA PROX DIAS: 11 CM/S
LEFT CCA PROX SYS: 66 CM/S
LEFT ECA DIAS: 4 CM/S
LEFT ECA SYS: 115 CM/S
LEFT ICA DIST DIAS: 16 CM/S
LEFT ICA DIST SYS: 67 CM/S
LEFT ICA MID DIAS: 16 CM/S
LEFT ICA MID SYS: 60 CM/S
LEFT ICA PROX DIAS: 9 CM/S
LEFT ICA PROX SYS: 46 CM/S
LEFT VERTEBRAL DIAS: 5 CM/S
LEFT VERTEBRAL SYS: 47 CM/S
OHS CV CAROTID RIGHT ICA EDV HIGHEST: 15
OHS CV CAROTID ULTRASOUND LEFT ICA/CCA RATIO: 0.89
OHS CV CAROTID ULTRASOUND RIGHT ICA/CCA RATIO: 0.93
OHS CV PV CAROTID LEFT HIGHEST CCA: 75
OHS CV PV CAROTID LEFT HIGHEST ICA: 67
OHS CV PV CAROTID RIGHT HIGHEST CCA: 73
OHS CV PV CAROTID RIGHT HIGHEST ICA: 63
OHS CV US CAROTID LEFT HIGHEST EDV: 16
RIGHT CCA DIST DIAS: 12 CM/S
RIGHT CCA DIST SYS: 68 CM/S
RIGHT CCA PROX DIAS: 12 CM/S
RIGHT CCA PROX SYS: 73 CM/S
RIGHT ECA DIAS: 6 CM/S
RIGHT ECA SYS: 85 CM/S
RIGHT ICA DIST DIAS: 14 CM/S
RIGHT ICA DIST SYS: 63 CM/S
RIGHT ICA MID DIAS: 15 CM/S
RIGHT ICA MID SYS: 62 CM/S
RIGHT ICA PROX DIAS: 8 CM/S
RIGHT ICA PROX SYS: 45 CM/S
RIGHT VERTEBRAL SYS: 37 CM/S

## 2022-11-16 NOTE — PHYSICIAN QUERY
PT Name: Yolanda Price  MR #: [id number]     DOCUMENTATION CLARIFICATION     CDS/: Janelle Monroy RN           Contact information: [email or web address]  This form is a permanent document in the medical record.    Query Date: November 16, 2022    By submitting this query, we are merely seeking further clarification of documentation.  Please utilize your independent clinical judgment when addressing the question(s) below.  The Medical Record contains the following:   Indicators   Supporting Clinical Findings Location in Medical Record   x Pneumonia documented Patchy left basilar opacity may be related to atelectasis or pneumonia    Pneumonia of left lower lobe due to infectious organism       ER 11/11 (curet)    Neurology 11/12 (Eaw) Consult   x Chest X-Ray/CT Scan FINDINGS:  LINES AND TUBES: EKG/telemetry leads overlie the chest.    MEDIASTINUM AND MORE: The cardiac silhouette is normal. Aortic atherosclerosis.    LUNGS: Patchy left basilar opacity.    PLEURA:No pleural effusion. No pneumothorax.    OTHER: No acute osseous abnormality.    Impression:    Patchy left basilar opacity may be related to atelectasis or pneumonia. Recommend follow up PA and lateral radiographs in 4-6 weeks after completion of appropriate therapy to ensure resolution and exclude persistent opacity, nodule or mass. Chest Xray 11/11    PaO2    PaCO2     O2 sat     x WBC Result Value      WBC 12.8 (*)       RBC 5.52 (*)       Hgb 17.0 (*)       Hct 48.9 (*)       MPV 12.0 (*)       Neut # 9.4 (*)     ER 11/11   x Vital Signs Initial Vitals [11/11/22 1729]   BP Pulse Resp Temp SpO2   112/63 92 18 98.2 °F (36.8 °C) 95 %    ER 11/11    Cultures      x Treatment  cefTRIAXone (ROCEPHIN) 1 g in dextrose 5 % in water (D5W) 5 % 50 mL IVPB (MB+) (0 g Intravenous Stopped 11/11/22 2245)      Indications of use: Lower Respiratory Infections   ER 11/11    Supplemental O2      Dysphagia/Swallow study      Other       Provider,  please provide the diagnosis related to the above clinical indicators:    [   ] Unspecified Pneumonia   [   ] Other type of pneumonia (please specify): ________   [   ] Pneumonia Ruled out   [  ] Clinically undetermined     WOULD RECOMMEND REFERRING TO THE MD THAT WROTE PNEUMONIA.  PER MY REVIEW THERE WAS NO PNEUMONIA AS YOU CAN SEE IT WAS NOT LISTED AS A DIAGNOSIS AND WAS WRITTEN IN MY DISCHARGE SUMMARY.       Please document in your progress notes daily for the duration of treatment, until resolved, and include in your discharge summary.     Form No. [id number]

## 2022-12-14 DIAGNOSIS — R06.09 DOE (DYSPNEA ON EXERTION): Primary | ICD-10-CM

## 2023-02-14 ENCOUNTER — HOSPITAL ENCOUNTER (OUTPATIENT)
Dept: CARDIOLOGY | Facility: HOSPITAL | Age: 78
Discharge: HOME | End: 2023-02-14
Attending: NURSE PRACTITIONER
Payer: MEDICAID

## 2023-02-14 DIAGNOSIS — R06.09 DOE (DYSPNEA ON EXERTION): ICD-10-CM

## 2024-08-06 ENCOUNTER — LAB REQUISITION (OUTPATIENT)
Dept: LAB | Facility: HOSPITAL | Age: 79
End: 2024-08-06

## 2024-08-06 DIAGNOSIS — E55.9 VITAMIN D DEFICIENCY, UNSPECIFIED: ICD-10-CM

## 2024-08-06 DIAGNOSIS — I10 ESSENTIAL (PRIMARY) HYPERTENSION: ICD-10-CM

## 2024-08-06 DIAGNOSIS — E83.42 HYPOMAGNESEMIA: ICD-10-CM

## 2024-08-06 DIAGNOSIS — M15.0 PRIMARY GENERALIZED (OSTEO)ARTHRITIS: ICD-10-CM

## 2024-08-06 LAB
ALBUMIN SERPL-MCNC: 3.9 G/DL (ref 3.4–4.8)
ALBUMIN/GLOB SERPL: 1.1 RATIO (ref 1.1–2)
ALP SERPL-CCNC: 79 UNIT/L (ref 40–150)
ALT SERPL-CCNC: 14 UNIT/L (ref 0–55)
ANION GAP SERPL CALC-SCNC: 11 MEQ/L
AST SERPL-CCNC: 12 UNIT/L (ref 5–34)
BACTERIA #/AREA URNS AUTO: ABNORMAL /HPF
BILIRUB SERPL-MCNC: 0.3 MG/DL
BILIRUB UR QL STRIP.AUTO: NEGATIVE
BUN SERPL-MCNC: 31.2 MG/DL (ref 9.8–20.1)
CALCIUM SERPL-MCNC: 9.5 MG/DL (ref 8.4–10.2)
CHLORIDE SERPL-SCNC: 105 MMOL/L (ref 98–107)
CHOLEST SERPL-MCNC: 260 MG/DL
CHOLEST/HDLC SERPL: 5 {RATIO} (ref 0–5)
CLARITY UR: ABNORMAL
CO2 SERPL-SCNC: 26 MMOL/L (ref 23–31)
COLOR UR AUTO: COLORLESS
CREAT SERPL-MCNC: 0.94 MG/DL (ref 0.55–1.02)
CREAT/UREA NIT SERPL: 33
ERYTHROCYTE [DISTWIDTH] IN BLOOD BY AUTOMATED COUNT: 12.3 % (ref 11.5–17)
GFR SERPLBLD CREATININE-BSD FMLA CKD-EPI: >60 ML/MIN/1.73/M2
GLOBULIN SER-MCNC: 3.6 GM/DL (ref 2.4–3.5)
GLUCOSE SERPL-MCNC: 117 MG/DL (ref 82–115)
GLUCOSE UR QL STRIP: NORMAL
HCT VFR BLD AUTO: 42.2 % (ref 37–47)
HDLC SERPL-MCNC: 49 MG/DL (ref 35–60)
HGB BLD-MCNC: 14.2 G/DL (ref 12–16)
HGB UR QL STRIP: NEGATIVE
KETONES UR QL STRIP: NEGATIVE
LDLC SERPL CALC-MCNC: 147 MG/DL (ref 50–140)
LEUKOCYTE ESTERASE UR QL STRIP: 500
MAGNESIUM SERPL-MCNC: 1.6 MG/DL (ref 1.6–2.6)
MCH RBC QN AUTO: 31.1 PG (ref 27–31)
MCHC RBC AUTO-ENTMCNC: 33.6 G/DL (ref 33–36)
MCV RBC AUTO: 92.3 FL (ref 80–94)
NITRITE UR QL STRIP: NEGATIVE
NRBC BLD AUTO-RTO: 0 %
PH UR STRIP: 6.5 [PH]
PLATELET # BLD AUTO: 212 X10(3)/MCL (ref 130–400)
PLATELETS.RETICULATED NFR BLD AUTO: 8.8 % (ref 0.9–11.2)
PMV BLD AUTO: 12.3 FL (ref 7.4–10.4)
POTASSIUM SERPL-SCNC: 4.1 MMOL/L (ref 3.5–5.1)
PROT SERPL-MCNC: 7.5 GM/DL (ref 5.8–7.6)
PROT UR QL STRIP: NEGATIVE
RBC # BLD AUTO: 4.57 X10(6)/MCL (ref 4.2–5.4)
RBC #/AREA URNS AUTO: ABNORMAL /HPF
SODIUM SERPL-SCNC: 142 MMOL/L (ref 136–145)
SP GR UR STRIP.AUTO: 1.01 (ref 1–1.03)
SQUAMOUS #/AREA URNS LPF: ABNORMAL /HPF
TRIGL SERPL-MCNC: 322 MG/DL (ref 37–140)
TSH SERPL-ACNC: 0.98 UIU/ML (ref 0.35–4.94)
UROBILINOGEN UR STRIP-ACNC: NORMAL
VLDLC SERPL CALC-MCNC: 64 MG/DL
WBC # BLD AUTO: 10.11 X10(3)/MCL (ref 4.5–11.5)
WBC #/AREA URNS AUTO: >100 /HPF

## 2024-08-06 PROCEDURE — 87086 URINE CULTURE/COLONY COUNT: CPT | Performed by: INTERNAL MEDICINE

## 2024-08-06 PROCEDURE — 85027 COMPLETE CBC AUTOMATED: CPT | Performed by: INTERNAL MEDICINE

## 2024-08-06 PROCEDURE — 80053 COMPREHEN METABOLIC PANEL: CPT | Performed by: INTERNAL MEDICINE

## 2024-08-06 PROCEDURE — 84443 ASSAY THYROID STIM HORMONE: CPT | Performed by: INTERNAL MEDICINE

## 2024-08-06 PROCEDURE — 80061 LIPID PANEL: CPT | Performed by: INTERNAL MEDICINE

## 2024-08-06 PROCEDURE — 83735 ASSAY OF MAGNESIUM: CPT | Performed by: INTERNAL MEDICINE

## 2024-08-06 PROCEDURE — 81001 URINALYSIS AUTO W/SCOPE: CPT | Performed by: INTERNAL MEDICINE

## 2024-08-08 LAB — BACTERIA UR CULT: NORMAL
